# Patient Record
Sex: MALE | Race: WHITE | NOT HISPANIC OR LATINO | Employment: UNEMPLOYED | URBAN - METROPOLITAN AREA
[De-identification: names, ages, dates, MRNs, and addresses within clinical notes are randomized per-mention and may not be internally consistent; named-entity substitution may affect disease eponyms.]

---

## 2017-07-03 ENCOUNTER — HOSPITAL ENCOUNTER (OUTPATIENT)
Facility: HOSPITAL | Age: 57
Discharge: HOME | End: 2017-07-07
Attending: EMERGENCY MEDICINE | Admitting: EMERGENCY MEDICINE
Payer: COMMERCIAL

## 2017-07-03 DIAGNOSIS — I10 ESSENTIAL HYPERTENSION: ICD-10-CM

## 2017-07-03 DIAGNOSIS — Z95.5 HISTORY OF HEART ARTERY STENT: ICD-10-CM

## 2017-07-03 DIAGNOSIS — R07.2 PRECORDIAL PAIN: ICD-10-CM

## 2017-07-03 DIAGNOSIS — K82.8 GALLBLADDER SLUDGE: ICD-10-CM

## 2017-07-03 DIAGNOSIS — I25.9 CHEST PAIN DUE TO MYOCARDIAL ISCHEMIA, UNSPECIFIED ISCHEMIC CHEST PAIN TYPE: ICD-10-CM

## 2017-07-03 DIAGNOSIS — K81.2 ACUTE CHOLECYSTITIS WITH CHRONIC CHOLECYSTITIS: Primary | ICD-10-CM

## 2017-07-03 DIAGNOSIS — R07.9 CHEST PAIN, UNSPECIFIED TYPE: ICD-10-CM

## 2017-07-03 LAB
ALBUMIN SERPL BCP-MCNC: 4.1 G/DL
ALP SERPL-CCNC: 83 U/L
ALT SERPL W/O P-5'-P-CCNC: 5 U/L
ANION GAP SERPL CALC-SCNC: 10 MMOL/L
AST SERPL-CCNC: 13 U/L
BASOPHILS # BLD AUTO: 0.04 K/UL
BASOPHILS NFR BLD: 0.4 %
BILIRUB SERPL-MCNC: 0.6 MG/DL
BNP SERPL-MCNC: 27 PG/ML
BUN SERPL-MCNC: 22 MG/DL
CALCIUM SERPL-MCNC: 9.9 MG/DL
CHLORIDE SERPL-SCNC: 103 MMOL/L
CO2 SERPL-SCNC: 25 MMOL/L
CREAT SERPL-MCNC: 1 MG/DL
DIFFERENTIAL METHOD: ABNORMAL
EOSINOPHIL # BLD AUTO: 0.1 K/UL
EOSINOPHIL NFR BLD: 0.7 %
ERYTHROCYTE [DISTWIDTH] IN BLOOD BY AUTOMATED COUNT: 13.5 %
EST. GFR  (AFRICAN AMERICAN): >60 ML/MIN/1.73 M^2
EST. GFR  (NON AFRICAN AMERICAN): >60 ML/MIN/1.73 M^2
GLUCOSE SERPL-MCNC: 156 MG/DL
HCT VFR BLD AUTO: 43.9 %
HGB BLD-MCNC: 15.3 G/DL
INR PPP: 1.3
LYMPHOCYTES # BLD AUTO: 2.4 K/UL
LYMPHOCYTES NFR BLD: 22.7 %
MCH RBC QN AUTO: 32.8 PG
MCHC RBC AUTO-ENTMCNC: 34.9 %
MCV RBC AUTO: 94 FL
MONOCYTES # BLD AUTO: 1 K/UL
MONOCYTES NFR BLD: 9.3 %
NEUTROPHILS # BLD AUTO: 7 K/UL
NEUTROPHILS NFR BLD: 66.7 %
PLATELET # BLD AUTO: 369 K/UL
PMV BLD AUTO: 9.1 FL
POTASSIUM SERPL-SCNC: 3.4 MMOL/L
PROT SERPL-MCNC: 8.3 G/DL
PROTHROMBIN TIME: 13.3 SEC
RBC # BLD AUTO: 4.67 M/UL
SODIUM SERPL-SCNC: 138 MMOL/L
TROPONIN I SERPL DL<=0.01 NG/ML-MCNC: 0.02 NG/ML
TROPONIN I SERPL DL<=0.01 NG/ML-MCNC: 0.02 NG/ML
WBC # BLD AUTO: 10.44 K/UL

## 2017-07-03 PROCEDURE — 85610 PROTHROMBIN TIME: CPT

## 2017-07-03 PROCEDURE — 36415 COLL VENOUS BLD VENIPUNCTURE: CPT

## 2017-07-03 PROCEDURE — 25000003 PHARM REV CODE 250: Performed by: EMERGENCY MEDICINE

## 2017-07-03 PROCEDURE — 93005 ELECTROCARDIOGRAM TRACING: CPT

## 2017-07-03 PROCEDURE — 63600175 PHARM REV CODE 636 W HCPCS: Performed by: NURSE PRACTITIONER

## 2017-07-03 PROCEDURE — 83880 ASSAY OF NATRIURETIC PEPTIDE: CPT

## 2017-07-03 PROCEDURE — 84484 ASSAY OF TROPONIN QUANT: CPT | Mod: 91

## 2017-07-03 PROCEDURE — 84484 ASSAY OF TROPONIN QUANT: CPT

## 2017-07-03 PROCEDURE — 99284 EMERGENCY DEPT VISIT MOD MDM: CPT

## 2017-07-03 PROCEDURE — G0378 HOSPITAL OBSERVATION PER HR: HCPCS

## 2017-07-03 PROCEDURE — 80053 COMPREHEN METABOLIC PANEL: CPT

## 2017-07-03 PROCEDURE — 93010 ELECTROCARDIOGRAM REPORT: CPT | Mod: ,,, | Performed by: INTERNAL MEDICINE

## 2017-07-03 PROCEDURE — 25000003 PHARM REV CODE 250: Performed by: NURSE PRACTITIONER

## 2017-07-03 PROCEDURE — 85025 COMPLETE CBC W/AUTO DIFF WBC: CPT

## 2017-07-03 RX ORDER — HYDROCODONE BITARTRATE AND ACETAMINOPHEN 10; 325 MG/1; MG/1
1 TABLET ORAL
Status: DISCONTINUED | OUTPATIENT
Start: 2017-07-03 | End: 2017-07-03

## 2017-07-03 RX ORDER — NITROGLYCERIN 0.4 MG/1
0.4 TABLET SUBLINGUAL EVERY 5 MIN PRN
Status: DISCONTINUED | OUTPATIENT
Start: 2017-07-03 | End: 2017-07-07 | Stop reason: HOSPADM

## 2017-07-03 RX ORDER — ASPIRIN 81 MG/1
81 TABLET ORAL DAILY
Status: DISCONTINUED | OUTPATIENT
Start: 2017-07-04 | End: 2017-07-07 | Stop reason: HOSPADM

## 2017-07-03 RX ORDER — MORPHINE SULFATE 10 MG/ML
4 INJECTION INTRAMUSCULAR; INTRAVENOUS; SUBCUTANEOUS EVERY 4 HOURS PRN
Status: DISCONTINUED | OUTPATIENT
Start: 2017-07-03 | End: 2017-07-03

## 2017-07-03 RX ORDER — ALPRAZOLAM 0.5 MG/1
0.5 TABLET ORAL 3 TIMES DAILY
COMMUNITY

## 2017-07-03 RX ORDER — ALPRAZOLAM 0.5 MG/1
0.5 TABLET ORAL 3 TIMES DAILY
Status: DISCONTINUED | OUTPATIENT
Start: 2017-07-03 | End: 2017-07-07 | Stop reason: HOSPADM

## 2017-07-03 RX ORDER — AMOXICILLIN 250 MG
1 CAPSULE ORAL 2 TIMES DAILY
Status: DISCONTINUED | OUTPATIENT
Start: 2017-07-03 | End: 2017-07-07 | Stop reason: HOSPADM

## 2017-07-03 RX ORDER — HYDRALAZINE HYDROCHLORIDE 20 MG/ML
10 INJECTION INTRAMUSCULAR; INTRAVENOUS EVERY 8 HOURS PRN
Status: DISCONTINUED | OUTPATIENT
Start: 2017-07-03 | End: 2017-07-07 | Stop reason: HOSPADM

## 2017-07-03 RX ORDER — HYDROCODONE BITARTRATE AND ACETAMINOPHEN 10; 325 MG/1; MG/1
1 TABLET ORAL EVERY 6 HOURS PRN
Status: DISCONTINUED | OUTPATIENT
Start: 2017-07-03 | End: 2017-07-07 | Stop reason: HOSPADM

## 2017-07-03 RX ORDER — ONDANSETRON 2 MG/ML
4 INJECTION INTRAMUSCULAR; INTRAVENOUS EVERY 12 HOURS PRN
Status: DISCONTINUED | OUTPATIENT
Start: 2017-07-03 | End: 2017-07-07 | Stop reason: HOSPADM

## 2017-07-03 RX ORDER — ACETAMINOPHEN 325 MG/1
650 TABLET ORAL EVERY 6 HOURS PRN
Status: DISCONTINUED | OUTPATIENT
Start: 2017-07-03 | End: 2017-07-03

## 2017-07-03 RX ORDER — ACETAMINOPHEN 325 MG/1
650 TABLET ORAL EVERY 6 HOURS PRN
Status: DISCONTINUED | OUTPATIENT
Start: 2017-07-03 | End: 2017-07-07 | Stop reason: HOSPADM

## 2017-07-03 RX ORDER — SODIUM CHLORIDE 0.9 % (FLUSH) 0.9 %
3 SYRINGE (ML) INJECTION EVERY 8 HOURS
Status: DISCONTINUED | OUTPATIENT
Start: 2017-07-03 | End: 2017-07-07 | Stop reason: HOSPADM

## 2017-07-03 RX ORDER — HEPARIN SODIUM 5000 [USP'U]/ML
5000 INJECTION, SOLUTION INTRAVENOUS; SUBCUTANEOUS EVERY 8 HOURS
Status: DISCONTINUED | OUTPATIENT
Start: 2017-07-03 | End: 2017-07-03

## 2017-07-03 RX ORDER — ASPIRIN 81 MG/1
81 TABLET ORAL DAILY
COMMUNITY

## 2017-07-03 RX ORDER — AMIODARONE HYDROCHLORIDE 150 MG/3ML
INJECTION, SOLUTION INTRAVENOUS
Status: DISCONTINUED
Start: 2017-07-03 | End: 2017-07-03 | Stop reason: WASHOUT

## 2017-07-03 RX ORDER — HYDROCODONE BITARTRATE AND ACETAMINOPHEN 10; 325 MG/1; MG/1
TABLET ORAL
COMMUNITY
End: 2019-03-25

## 2017-07-03 RX ORDER — ENOXAPARIN SODIUM 100 MG/ML
40 INJECTION SUBCUTANEOUS EVERY 24 HOURS
Status: DISCONTINUED | OUTPATIENT
Start: 2017-07-03 | End: 2017-07-06

## 2017-07-03 RX ORDER — DIAZEPAM 5 MG/1
5 TABLET ORAL CONTINUOUS PRN
COMMUNITY
End: 2019-03-25

## 2017-07-03 RX ORDER — ASPIRIN 325 MG
325 TABLET ORAL ONCE
Status: DISCONTINUED | OUTPATIENT
Start: 2017-07-03 | End: 2017-07-03

## 2017-07-03 RX ORDER — DIAZEPAM 5 MG/1
5 TABLET ORAL EVERY 6 HOURS PRN
Status: DISCONTINUED | OUTPATIENT
Start: 2017-07-03 | End: 2017-07-03

## 2017-07-03 RX ORDER — LISINOPRIL 5 MG/1
5 TABLET ORAL DAILY
Status: DISCONTINUED | OUTPATIENT
Start: 2017-07-04 | End: 2017-07-04

## 2017-07-03 RX ORDER — ASPIRIN 325 MG
325 TABLET ORAL DAILY
Status: DISCONTINUED | OUTPATIENT
Start: 2017-07-03 | End: 2017-07-03

## 2017-07-03 RX ORDER — FAMOTIDINE 20 MG/1
20 TABLET, FILM COATED ORAL DAILY
Status: DISCONTINUED | OUTPATIENT
Start: 2017-07-04 | End: 2017-07-07 | Stop reason: HOSPADM

## 2017-07-03 RX ORDER — METOPROLOL TARTRATE 25 MG/1
25 TABLET, FILM COATED ORAL 2 TIMES DAILY
Status: DISCONTINUED | OUTPATIENT
Start: 2017-07-03 | End: 2017-07-07 | Stop reason: HOSPADM

## 2017-07-03 RX ADMIN — ALPRAZOLAM 0.5 MG: 0.5 TABLET ORAL at 09:07

## 2017-07-03 RX ADMIN — ASPIRIN 325 MG ORAL TABLET 325 MG: 325 PILL ORAL at 05:07

## 2017-07-03 RX ADMIN — METOPROLOL TARTRATE 25 MG: 25 TABLET ORAL at 09:07

## 2017-07-03 RX ADMIN — HYDROCODONE BITARTRATE AND ACETAMINOPHEN 1 TABLET: 10; 325 TABLET ORAL at 09:07

## 2017-07-03 RX ADMIN — ENOXAPARIN SODIUM 40 MG: 100 INJECTION SUBCUTANEOUS at 09:07

## 2017-07-03 NOTE — ED TRIAGE NOTES
Pt comes in with complaints of intermittent left side chest tightness and sob x 5 days which occurs only when he ambulates. Pt states he has cardiac stents. Pt denies cough, fever/chills, or nausea.

## 2017-07-03 NOTE — ED PROVIDER NOTES
Encounter Date: 7/3/2017    SCRIBE #1 NOTE: I, Krystian Sorensen, am scribing for, and in the presence of,  Georges Santiago MD. I have scribed the following portions of the note - Other sections scribed: HPI/ROS/PE.       History     Chief Complaint   Patient presents with    Chest Pain     pt states left sided cp with sob times 3 days      CC: Chest Pain    HPI: This 56 y.o. Male with a PMHx of HTN presents to the ED c/o intermittent, moderate chest pain with associated symptoms SOB that initially began about 5 days ago. Patient reports that pain is exacerbated with exertion and alleviated with 30 minutes of rest. Patient notes that he had a stent placed due to a 95% blockage in 2014 by Dr. Evangelista. He is non-compliant with nitroglycerin. Patient denies fever, chills, N/V/D, abdominal pain, neck pain, back pain, numbness, and weakness.       The history is provided by the patient and the spouse. No  was used.     Review of patient's allergies indicates:   Allergen Reactions    Iodine and iodide containing products Hives     Past Medical History:   Diagnosis Date    Hypertension      Past Surgical History:   Procedure Laterality Date    cardiac stents      KIDNEY STONE SURGERY      neck surgery       History reviewed. No pertinent family history.  Social History   Substance Use Topics    Smoking status: Never Smoker    Smokeless tobacco: Never Used    Alcohol use No     Review of Systems   Constitutional: Negative for chills and fever.   HENT: Negative for congestion.    Eyes: Negative for pain.   Respiratory: Positive for shortness of breath. Negative for cough.    Cardiovascular: Positive for chest pain.   Gastrointestinal: Negative for abdominal pain, diarrhea, nausea and vomiting.   Genitourinary: Negative for difficulty urinating.   Musculoskeletal: Negative for back pain, neck pain and neck stiffness.   Skin: Negative for rash.   Neurological: Negative for weakness, numbness and  headaches.       Physical Exam     Initial Vitals [07/03/17 1348]   BP Pulse Resp Temp SpO2   (!) 135/99 94 18 98.3 °F (36.8 °C) 99 %      MAP       111         Physical Exam    Constitutional: He appears well-developed and well-nourished.   HENT:   Head: Normocephalic and atraumatic.   Eyes: EOM are normal.   Neck: Normal range of motion. Neck supple.   Cardiovascular: Normal rate and regular rhythm.   Pulmonary/Chest: Breath sounds normal. No respiratory distress.   Abdominal: Soft. Bowel sounds are normal.   Musculoskeletal: Normal range of motion.   Neurological: He is alert.   Skin: Skin is warm and dry.   Psychiatric: He has a normal mood and affect.         ED Course   Procedures  Labs Reviewed   CBC W/ AUTO DIFFERENTIAL - Abnormal; Notable for the following:        Result Value    MCH 32.8 (*)     Platelets 369 (*)     MPV 9.1 (*)     All other components within normal limits   COMPREHENSIVE METABOLIC PANEL - Abnormal; Notable for the following:     Potassium 3.4 (*)     Glucose 156 (*)     BUN, Bld 22 (*)     ALT 5 (*)     All other components within normal limits   PROTIME-INR - Abnormal; Notable for the following:     Prothrombin Time 13.3 (*)     INR 1.3 (*)     All other components within normal limits   B-TYPE NATRIURETIC PEPTIDE   TROPONIN I     EKG Readings: (Independently Interpreted)   Initial Reading: No STEMI. Rhythm: Sinus Tachycardia. Heart Rate: 104. ST Segments: Normal ST Segments. T Waves Flipped: III and AVF.          Medical Decision Making:   History:   Old Medical Records: I decided to obtain old medical records.  Clinical Tests:   Lab Tests: Ordered and Reviewed  Radiological Study: Ordered and Reviewed  Medical Tests: Ordered and Reviewed  ED Management:  This is a 56-year-old male complaining of exertional chest pain every time he walks for the past 5 days.  He has a history of CAD and received a stent in 2014.  He did not take nitroglycerin.  He is noncompliant with medications.  His  cardiologist is Dr. Evangelista.  His EKG and initial troponin have been normal.  His history suggests unstable angina.  There is no evidence of ACS or heart failure at this time.  He does not have pneumonia or pneumothorax.  His case is discussed with Dr. Forrester.  We agreed that the patient will be admitted for observation.  Today is July 3.  He will have an angiogram done on July 5.  He will be observed in the hospital.  He is stable for telemetry.            Scribe Attestation:   Scribe #1: I performed the above scribed service and the documentation accurately describes the services I performed. I attest to the accuracy of the note.    Attending Attestation:           Physician Attestation for Scribe:  Physician Attestation Statement for Scribe #1: I, Georges Santiago MD, reviewed documentation, as scribed by Krystian Sorensen in my presence, and it is both accurate and complete.                 ED Course     Clinical Impression:   The encounter diagnosis was Chest pain, unspecified type.    Disposition:   Disposition: Placed in Observation  Condition: Stable                        Georges Santiago MD  07/03/17 1326

## 2017-07-04 PROBLEM — F41.9 ANXIETY: Status: ACTIVE | Noted: 2017-07-04

## 2017-07-04 LAB
ALBUMIN SERPL BCP-MCNC: 3.8 G/DL
ALP SERPL-CCNC: 82 U/L
ALT SERPL W/O P-5'-P-CCNC: 6 U/L
ANION GAP SERPL CALC-SCNC: 10 MMOL/L
AST SERPL-CCNC: 12 U/L
BASOPHILS # BLD AUTO: 0.06 K/UL
BASOPHILS NFR BLD: 0.6 %
BILIRUB SERPL-MCNC: 0.5 MG/DL
BUN SERPL-MCNC: 22 MG/DL
CALCIUM SERPL-MCNC: 9.9 MG/DL
CHLORIDE SERPL-SCNC: 102 MMOL/L
CHOLEST/HDLC SERPL: 6 {RATIO}
CO2 SERPL-SCNC: 28 MMOL/L
CREAT SERPL-MCNC: 1 MG/DL
DIFFERENTIAL METHOD: ABNORMAL
EOSINOPHIL # BLD AUTO: 0.3 K/UL
EOSINOPHIL NFR BLD: 2.7 %
ERYTHROCYTE [DISTWIDTH] IN BLOOD BY AUTOMATED COUNT: 14 %
EST. GFR  (AFRICAN AMERICAN): >60 ML/MIN/1.73 M^2
EST. GFR  (NON AFRICAN AMERICAN): >60 ML/MIN/1.73 M^2
GLUCOSE SERPL-MCNC: 104 MG/DL
HCT VFR BLD AUTO: 45 %
HDL/CHOLESTEROL RATIO: 16.7 %
HDLC SERPL-MCNC: 258 MG/DL
HDLC SERPL-MCNC: 43 MG/DL
HGB BLD-MCNC: 15.2 G/DL
LDLC SERPL CALC-MCNC: 189.4 MG/DL
LYMPHOCYTES # BLD AUTO: 3.4 K/UL
LYMPHOCYTES NFR BLD: 36.8 %
MCH RBC QN AUTO: 32.5 PG
MCHC RBC AUTO-ENTMCNC: 33.8 %
MCV RBC AUTO: 96 FL
MONOCYTES # BLD AUTO: 1.2 K/UL
MONOCYTES NFR BLD: 12.6 %
NEUTROPHILS # BLD AUTO: 4.4 K/UL
NEUTROPHILS NFR BLD: 47.3 %
NONHDLC SERPL-MCNC: 215 MG/DL
PLATELET # BLD AUTO: 330 K/UL
PMV BLD AUTO: 9.4 FL
POTASSIUM SERPL-SCNC: 3.7 MMOL/L
PROT SERPL-MCNC: 7.8 G/DL
RBC # BLD AUTO: 4.68 M/UL
SODIUM SERPL-SCNC: 140 MMOL/L
T4 FREE SERPL-MCNC: 1.32 NG/DL
TRIGL SERPL-MCNC: 128 MG/DL
TROPONIN I SERPL DL<=0.01 NG/ML-MCNC: <0.006 NG/ML
TSH SERPL DL<=0.005 MIU/L-ACNC: 0.37 UIU/ML
WBC # BLD AUTO: 9.24 K/UL

## 2017-07-04 PROCEDURE — 36415 COLL VENOUS BLD VENIPUNCTURE: CPT

## 2017-07-04 PROCEDURE — 25000003 PHARM REV CODE 250: Performed by: EMERGENCY MEDICINE

## 2017-07-04 PROCEDURE — 25000003 PHARM REV CODE 250: Performed by: NURSE PRACTITIONER

## 2017-07-04 PROCEDURE — G0378 HOSPITAL OBSERVATION PER HR: HCPCS

## 2017-07-04 PROCEDURE — 85025 COMPLETE CBC W/AUTO DIFF WBC: CPT

## 2017-07-04 PROCEDURE — 63600175 PHARM REV CODE 636 W HCPCS: Performed by: INTERNAL MEDICINE

## 2017-07-04 PROCEDURE — 80053 COMPREHEN METABOLIC PANEL: CPT

## 2017-07-04 PROCEDURE — 84443 ASSAY THYROID STIM HORMONE: CPT

## 2017-07-04 PROCEDURE — 63600175 PHARM REV CODE 636 W HCPCS: Performed by: EMERGENCY MEDICINE

## 2017-07-04 PROCEDURE — 84439 ASSAY OF FREE THYROXINE: CPT

## 2017-07-04 PROCEDURE — 25000003 PHARM REV CODE 250: Performed by: INTERNAL MEDICINE

## 2017-07-04 PROCEDURE — 80061 LIPID PANEL: CPT

## 2017-07-04 PROCEDURE — 84484 ASSAY OF TROPONIN QUANT: CPT

## 2017-07-04 PROCEDURE — 63600175 PHARM REV CODE 636 W HCPCS: Performed by: NURSE PRACTITIONER

## 2017-07-04 RX ORDER — PREDNISONE 20 MG/1
60 TABLET ORAL ONCE
Status: COMPLETED | OUTPATIENT
Start: 2017-07-05 | End: 2017-07-05

## 2017-07-04 RX ORDER — PREDNISONE 20 MG/1
60 TABLET ORAL DAILY
Status: DISCONTINUED | OUTPATIENT
Start: 2017-07-04 | End: 2017-07-04

## 2017-07-04 RX ORDER — DIPHENHYDRAMINE HCL 50 MG
50 CAPSULE ORAL ONCE
Status: COMPLETED | OUTPATIENT
Start: 2017-07-05 | End: 2017-07-05

## 2017-07-04 RX ORDER — DIPHENHYDRAMINE HCL 50 MG
50 CAPSULE ORAL ONCE
Status: DISCONTINUED | OUTPATIENT
Start: 2017-07-04 | End: 2017-07-04

## 2017-07-04 RX ORDER — ATORVASTATIN CALCIUM 40 MG/1
80 TABLET, FILM COATED ORAL DAILY
Status: DISCONTINUED | OUTPATIENT
Start: 2017-07-04 | End: 2017-07-07 | Stop reason: HOSPADM

## 2017-07-04 RX ORDER — LISINOPRIL 20 MG/1
20 TABLET ORAL DAILY
Status: DISCONTINUED | OUTPATIENT
Start: 2017-07-04 | End: 2017-07-07 | Stop reason: HOSPADM

## 2017-07-04 RX ORDER — RAMELTEON 8 MG/1
8 TABLET ORAL NIGHTLY PRN
Status: DISCONTINUED | OUTPATIENT
Start: 2017-07-04 | End: 2017-07-07 | Stop reason: HOSPADM

## 2017-07-04 RX ORDER — PREDNISONE 20 MG/1
60 TABLET ORAL DAILY
Status: DISCONTINUED | OUTPATIENT
Start: 2017-07-05 | End: 2017-07-04

## 2017-07-04 RX ORDER — AMLODIPINE BESYLATE 5 MG/1
5 TABLET ORAL DAILY
Status: DISCONTINUED | OUTPATIENT
Start: 2017-07-04 | End: 2017-07-07 | Stop reason: HOSPADM

## 2017-07-04 RX ORDER — PREDNISONE 20 MG/1
60 TABLET ORAL ONCE
Status: COMPLETED | OUTPATIENT
Start: 2017-07-04 | End: 2017-07-04

## 2017-07-04 RX ORDER — ISOSORBIDE MONONITRATE 30 MG/1
60 TABLET, EXTENDED RELEASE ORAL DAILY
Status: DISCONTINUED | OUTPATIENT
Start: 2017-07-04 | End: 2017-07-07 | Stop reason: HOSPADM

## 2017-07-04 RX ORDER — PREDNISONE 20 MG/1
60 TABLET ORAL ONCE
Status: DISCONTINUED | OUTPATIENT
Start: 2017-07-04 | End: 2017-07-04

## 2017-07-04 RX ADMIN — ALPRAZOLAM 0.5 MG: 0.5 TABLET ORAL at 06:07

## 2017-07-04 RX ADMIN — Medication 3 ML: at 09:07

## 2017-07-04 RX ADMIN — Medication 3 ML: at 02:07

## 2017-07-04 RX ADMIN — ISOSORBIDE MONONITRATE 60 MG: 30 TABLET, EXTENDED RELEASE ORAL at 09:07

## 2017-07-04 RX ADMIN — DOCUSATE SODIUM AND SENNOSIDES 1 TABLET: 8.6; 5 TABLET, FILM COATED ORAL at 09:07

## 2017-07-04 RX ADMIN — SODIUM CHLORIDE 1000 ML: 0.9 INJECTION, SOLUTION INTRAVENOUS at 10:07

## 2017-07-04 RX ADMIN — ALPRAZOLAM 0.5 MG: 0.5 TABLET ORAL at 09:07

## 2017-07-04 RX ADMIN — ASPIRIN 81 MG: 81 TABLET, COATED ORAL at 09:07

## 2017-07-04 RX ADMIN — ALPRAZOLAM 0.5 MG: 0.5 TABLET ORAL at 02:07

## 2017-07-04 RX ADMIN — ATORVASTATIN CALCIUM 80 MG: 40 TABLET, FILM COATED ORAL at 09:07

## 2017-07-04 RX ADMIN — PREDNISONE 60 MG: 20 TABLET ORAL at 09:07

## 2017-07-04 RX ADMIN — METOPROLOL TARTRATE 25 MG: 25 TABLET ORAL at 09:07

## 2017-07-04 RX ADMIN — HYDROCODONE BITARTRATE AND ACETAMINOPHEN 1 TABLET: 10; 325 TABLET ORAL at 07:07

## 2017-07-04 RX ADMIN — ONDANSETRON 4 MG: 2 INJECTION INTRAMUSCULAR; INTRAVENOUS at 11:07

## 2017-07-04 RX ADMIN — ENOXAPARIN SODIUM 40 MG: 100 INJECTION SUBCUTANEOUS at 05:07

## 2017-07-04 RX ADMIN — AMLODIPINE BESYLATE 5 MG: 5 TABLET ORAL at 09:07

## 2017-07-04 RX ADMIN — FAMOTIDINE 20 MG: 20 TABLET, FILM COATED ORAL at 09:07

## 2017-07-04 RX ADMIN — LISINOPRIL 20 MG: 20 TABLET ORAL at 09:07

## 2017-07-04 NOTE — SUBJECTIVE & OBJECTIVE
Past Medical History:   Diagnosis Date    Hypertension        Past Surgical History:   Procedure Laterality Date    cardiac stents      KIDNEY STONE SURGERY      neck surgery         Review of patient's allergies indicates:   Allergen Reactions    Iodine and iodide containing products Hives       No current facility-administered medications on file prior to encounter.      No current outpatient prescriptions on file prior to encounter.     Family History     None        Social History Main Topics    Smoking status: Never Smoker    Smokeless tobacco: Never Used    Alcohol use No    Drug use: Unknown    Sexual activity: Not on file     Review of Systems   Constitutional: Negative for chills, diaphoresis and fever.   HENT: Negative for congestion, postnasal drip, sinus pressure and sore throat.    Eyes: Negative for visual disturbance.   Respiratory: Positive for shortness of breath. Negative for cough and wheezing.    Cardiovascular: Positive for chest pain (non-radiating left sided). Negative for palpitations and leg swelling.        Heart flutters   Gastrointestinal: Negative for abdominal distention, abdominal pain, blood in stool, constipation, diarrhea, nausea and vomiting.   Endocrine: Negative.    Genitourinary: Negative for dysuria.   Musculoskeletal: Negative.    Skin: Negative.    Allergic/Immunologic: Negative.    Neurological: Negative for dizziness, weakness, numbness and headaches.   Hematological: Negative.    Psychiatric/Behavioral: Negative.      Objective:     Vital Signs (Most Recent):  Temp: 98.4 °F (36.9 °C) (07/03/17 1837)  Pulse: 80 (07/03/17 1837)  Resp: 18 (07/03/17 1837)  BP: (!) 168/92 (07/03/17 1837)  SpO2: 98 % (07/03/17 1837) Vital Signs (24h Range):  Temp:  [98.3 °F (36.8 °C)-98.4 °F (36.9 °C)] 98.4 °F (36.9 °C)  Pulse:  [78-94] 80  Resp:  [18] 18  SpO2:  [95 %-99 %] 98 %  BP: (133-170)/() 168/92     Weight: 77.1 kg (170 lb)  Body mass index is 25.1 kg/m².    Physical  Exam   Constitutional: He is oriented to person, place, and time. He appears well-developed and well-nourished. He is cooperative.  Non-toxic appearance. No distress.   HENT:   Head: Normocephalic and atraumatic.   Eyes: Conjunctivae and lids are normal. Pupils are equal, round, and reactive to light.   Neck: Trachea normal, normal range of motion and full passive range of motion without pain. Neck supple. Normal carotid pulses and no JVD present. No tracheal deviation present. No thyroid mass and no thyromegaly present.   Cardiovascular: Regular rhythm, S1 normal, S2 normal, normal heart sounds and normal pulses.  Tachycardia present.    Pulmonary/Chest: Effort normal and breath sounds normal. No stridor.   Abdominal: Soft. Normal appearance and bowel sounds are normal. There is no tenderness.   Musculoskeletal: Normal range of motion.   Neurological: He is alert and oriented to person, place, and time. He has normal strength. No cranial nerve deficit or sensory deficit.   Skin: Skin is warm, dry and intact. He is not diaphoretic. No cyanosis. Nails show no clubbing.   Psychiatric: He has a normal mood and affect. His speech is normal and behavior is normal. Judgment and thought content normal. Cognition and memory are normal.        Significant Labs:   CBC:   Recent Labs  Lab 07/03/17  1400   WBC 10.44   HGB 15.3   HCT 43.9   *     CMP:   Recent Labs  Lab 07/03/17  1400      K 3.4*      CO2 25   *   BUN 22*   CREATININE 1.0   CALCIUM 9.9   PROT 8.3   ALBUMIN 4.1   BILITOT 0.6   ALKPHOS 83   AST 13   ALT 5*   ANIONGAP 10   EGFRNONAA >60     Cardiac Markers:   Recent Labs  Lab 07/03/17  1400   BNP 27     Troponin:   Recent Labs  Lab 07/03/17  1400   TROPONINI 0.016     Significant Imaging:   Imaging Results          X-Ray Chest PA And Lateral (Final result)  Result time 07/03/17 14:43:47    Final result by Siddharth Norwood MD (07/03/17 14:43:47)                 Impression:        No  evidence of active cardiopulmonary disease.      Electronically signed by: MANDO SHEARER MD  Date:     07/03/17  Time:    14:43              Narrative:    XR Chest    07/03/17 14:03:31    Accession# [id number]    CLINICAL INDICATION: 56 year old M with Chest Pain     TECHNIQUE: PA and lateral radiographs of the chest.    COMPARISON:  No priors.    FINDINGS:    The cardiomediastinal silhouette is normal in size and midline. Pulmonary vascularity appears within normal limits.    The lungs appear clear without confluent pulmonary parenchymal opacity. No pleural fluid.    Osseous structures appear intact. Lower cervical ACDF.

## 2017-07-04 NOTE — NURSING
PER handoff received from CHRISTINA Bundy RN. Responds to voice and pt is oriented x4 . Denies having any pain and is in no apparent stress at current time. Assessment completed per Doc Flowsheets; reference if needed. Updated pt on plan of care. Fall and safety precautions maintained. Bed locked in lowest position, with side rails up x2. Call bell and personal items within reach. Will continue to monitor pt for any changes.

## 2017-07-04 NOTE — SUBJECTIVE & OBJECTIVE
Interval History: continued nausea and decreased appetite. Intermittent CP; restless - Cardiology following - [facility] in am    Review of Systems   Constitutional: Negative for appetite change, chills, diaphoresis and fever.   HENT: Negative for congestion, hearing loss, sore throat, tinnitus and trouble swallowing.    Eyes: Negative for photophobia, discharge, itching and visual disturbance.   Respiratory: Negative for apnea, cough, wheezing and stridor.    Cardiovascular: Positive for chest pain. Negative for palpitations and leg swelling.   Gastrointestinal: Positive for nausea. Negative for abdominal distention, abdominal pain, blood in stool, constipation and diarrhea.   Endocrine: Negative for polydipsia, polyphagia and polyuria.   Genitourinary: Negative for difficulty urinating, dysuria, flank pain and frequency.   Musculoskeletal: Negative for arthralgias, joint swelling and neck stiffness.   Skin: Negative for color change, rash and wound.   Neurological: Negative for dizziness, tremors, seizures, light-headedness, numbness and headaches.   Hematological: Negative for adenopathy.   Psychiatric/Behavioral: Negative for hallucinations and self-injury.     Objective:     Vital Signs (Most Recent):  Temp: 98 °F (36.7 °C) (07/04/17 1120)  Pulse: (!) 50 (07/04/17 1120)  Resp: 18 (07/04/17 1120)  BP: 105/68 (07/04/17 1120)  SpO2: 98 % (07/04/17 1120) Vital Signs (24h Range):  Temp:  [97.6 °F (36.4 °C)-98.5 °F (36.9 °C)] 98 °F (36.7 °C)  Pulse:  [50-94] 50  Resp:  [16-18] 18  SpO2:  [95 %-100 %] 98 %  BP: (105-197)/() 105/68     Weight: 73.3 kg (161 lb 9.6 oz)  Body mass index is 23.86 kg/m².  No intake or output data in the 24 hours ending 07/04/17 1248   Physical Exam   Constitutional: He appears well-developed and well-nourished. He is cooperative.   HENT:   Head: Normocephalic and atraumatic.   Eyes: Conjunctivae and lids are normal.   Neck: Full passive range of motion without pain. Neck supple. No JVD  present. No edema present. No thyroid mass present.   Cardiovascular: S1 normal, S2 normal and intact distal pulses.    No murmur heard.  Abdominal: Soft. Bowel sounds are normal. He exhibits no distension and no abdominal bruit. There is no splenomegaly or hepatomegaly. There is no tenderness. There is no CVA tenderness.   nausea   Lymphadenopathy:     He has no cervical adenopathy.     He has no axillary adenopathy.   Neurological: He is alert. He has normal reflexes. He displays no tremor. He displays no seizure activity.   Skin: Skin is warm, dry and intact.   Psychiatric: He has a normal mood and affect. His speech is normal. Thought content normal. Cognition and memory are normal.       Significant Labs:   BMP:   Recent Labs  Lab 07/04/17 0423         K 3.7      CO2 28   BUN 22*   CREATININE 1.0   CALCIUM 9.9     CBC:   Recent Labs  Lab 07/03/17 1400 07/04/17  0423   WBC 10.44 9.24   HGB 15.3 15.2   HCT 43.9 45.0   * 330     Cardiac Markers:   Recent Labs  Lab 07/03/17  1400   BNP 27     Coagulation:   Recent Labs  Lab 07/03/17  1400   INR 1.3*     Lipase: No results for input(s): LIPASE in the last 48 hours.  Lipid Panel:   Recent Labs  Lab 07/04/17  0423   CHOL 258*   HDL 43   LDLCALC 189.4*   TRIG 128   CHOLHDL 16.7*     Magnesium: No results for input(s): MG in the last 48 hours.  Troponin:   Recent Labs  Lab 07/03/17 1400 07/03/17 2009 07/04/17  0157   TROPONINI 0.016 0.018 <0.006     TSH:   Recent Labs  Lab 07/04/17  0423   TSH 0.370*     Urine Culture: No results for input(s): LABURIN in the last 48 hours.    Significant Imaging:   Imaging Results          X-Ray Chest PA And Lateral (Final result)  Result time 07/03/17 14:43:47    Final result by Mando Shearer MD (07/03/17 14:43:47)                 Impression:        No evidence of active cardiopulmonary disease.      Electronically signed by: MANDO SHEARER MD  Date:     07/03/17  Time:    14:43              Narrative:     XR Chest    07/03/17 14:03:31    Accession# [id number]    CLINICAL INDICATION: 56 year old M with Chest Pain     TECHNIQUE: PA and lateral radiographs of the chest.    COMPARISON:  No priors.    FINDINGS:    The cardiomediastinal silhouette is normal in size and midline. Pulmonary vascularity appears within normal limits.    The lungs appear clear without confluent pulmonary parenchymal opacity. No pleural fluid.    Osseous structures appear intact. Lower cervical ACDF.

## 2017-07-04 NOTE — H&P
"[facility] Medicine  History & Physical    Patient Name: Kayden Zamora  MRN: [id number]  Admission Date: 7/3/2017  Attending Physician: Roseann Jose MD   Primary Care Provider: Oral Gallardo MD         Patient information was obtained from patient, spouse/SO and ER records.     Subjective:     Principal Problem:Chest pain    Chief Complaint:   Chief Complaint   Patient presents with    Chest Pain     pt states left sided cp with sob times 3 days         HPI: Kayden Zamora is a 56 y.o. male with a history as below who presented to the ED with a CC of non-radiating left sided chest pain 7/10 with associated flutters and SOB ~ 5 days.  He describes the chest pain as "tightness" intermittent with activity with each episode lasting ~ 20-30 minutes and resolving with rest. He denies fever, chills, diaphoretic, cough, BLE edema, GI/ symptoms, recent illness, trauma or any other associated symptoms. He reports he takes a ASA 81 mg daily.  Patient states he thought symptoms could be stress/anxiety so he took an old valium that he had and without relief. He also reports he had a stent place in March 2014 and endorses non-adherences to previous medications regimen after stent placement and states "I just felt like I didn't need them". Family history - mon(a) multiple stents and TIAs; dad (a) 5 vessel CABG.  He denies tobacco or ETOH use.  Found in ED to have non-ischemic EKG.  Initial troponin negative. BNP wnl. CXR is non-revealing. Dr. Forrester, crdiology, consulted in ED with plans for LHC on Wednesday.  Admitted to OBS for symptoms management. He denies recurrent CP and is non-distressed.                                                         Past Medical History:   Diagnosis Date    Hypertension        Past Surgical History:   Procedure Laterality Date    cardiac stents      KIDNEY STONE SURGERY      neck surgery         Review of patient's allergies indicates:   Allergen Reactions "    Iodine and iodide containing products Hives       No current facility-administered medications on file prior to encounter.      No current outpatient prescriptions on file prior to encounter.     Family History     None        Social History Main Topics    Smoking status: Never Smoker    Smokeless tobacco: Never Used    Alcohol use No    Drug use: Unknown    Sexual activity: Not on file     Review of Systems   Constitutional: Negative for chills, diaphoresis and fever.   HENT: Negative for congestion, postnasal drip, sinus pressure and sore throat.    Eyes: Negative for visual disturbance.   Respiratory: Positive for shortness of breath. Negative for cough and wheezing.    Cardiovascular: Positive for chest pain (non-radiating left sided). Negative for palpitations and leg swelling.        Heart flutters   Gastrointestinal: Negative for abdominal distention, abdominal pain, blood in stool, constipation, diarrhea, nausea and vomiting.   Endocrine: Negative.    Genitourinary: Negative for dysuria.   Musculoskeletal: Negative.    Skin: Negative.    Allergic/Immunologic: Negative.    Neurological: Negative for dizziness, weakness, numbness and headaches.   Hematological: Negative.    Psychiatric/Behavioral: Negative.      Objective:     Vital Signs (Most Recent):  Temp: 98.4 °F (36.9 °C) (07/03/17 1837)  Pulse: 80 (07/03/17 1837)  Resp: 18 (07/03/17 1837)  BP: (!) 168/92 (07/03/17 1837)  SpO2: 98 % (07/03/17 1837) Vital Signs (24h Range):  Temp:  [98.3 °F (36.8 °C)-98.4 °F (36.9 °C)] 98.4 °F (36.9 °C)  Pulse:  [78-94] 80  Resp:  [18] 18  SpO2:  [95 %-99 %] 98 %  BP: (133-170)/() 168/92     Weight: 77.1 kg (170 lb)  Body mass index is 25.1 kg/m².    Physical Exam   Constitutional: He is oriented to person, place, and time. He appears well-developed and well-nourished. He is cooperative.  Non-toxic appearance. No distress.   HENT:   Head: Normocephalic and atraumatic.   Eyes: Conjunctivae and lids are  normal. Pupils are equal, round, and reactive to light.   Neck: Trachea normal, normal range of motion and full passive range of motion without pain. Neck supple. Normal carotid pulses and no JVD present. No tracheal deviation present. No thyroid mass and no thyromegaly present.   Cardiovascular: Regular rhythm, S1 normal, S2 normal, normal heart sounds and normal pulses.  Tachycardia present.    Pulmonary/Chest: Effort normal and breath sounds normal. No stridor.   Abdominal: Soft. Normal appearance and bowel sounds are normal. There is no tenderness.   Musculoskeletal: Normal range of motion.   Neurological: He is alert and oriented to person, place, and time. He has normal strength. No cranial nerve deficit or sensory deficit.   Skin: Skin is warm, dry and intact. He is not diaphoretic. No cyanosis. Nails show no clubbing.   Psychiatric: He has a normal mood and affect. His speech is normal and behavior is normal. Judgment and thought content normal. Cognition and memory are normal.        Significant Labs:   CBC:   Recent Labs  Lab 07/03/17  1400   WBC 10.44   HGB 15.3   HCT 43.9   *     CMP:   Recent Labs  Lab 07/03/17  1400      K 3.4*      CO2 25   *   BUN 22*   CREATININE 1.0   CALCIUM 9.9   PROT 8.3   ALBUMIN 4.1   BILITOT 0.6   ALKPHOS 83   AST 13   ALT 5*   ANIONGAP 10   EGFRNONAA >60     Cardiac Markers:   Recent Labs  Lab 07/03/17  1400   BNP 27     Troponin:   Recent Labs  Lab 07/03/17  1400   TROPONINI 0.016     Significant Imaging:   Imaging Results          X-Ray Chest PA And Lateral (Final result)  Result time 07/03/17 14:43:47    Final result by Mando Shearer MD (07/03/17 14:43:47)                 Impression:        No evidence of active cardiopulmonary disease.      Electronically signed by: MANDO SHEARER MD  Date:     07/03/17  Time:    14:43              Narrative:    XR Chest    07/03/17 14:03:31    Accession# [id number]    CLINICAL INDICATION: 56 year old M with  Chest Pain     TECHNIQUE: PA and lateral radiographs of the chest.    COMPARISON:  No priors.    FINDINGS:    The cardiomediastinal silhouette is normal in size and midline. Pulmonary vascularity appears within normal limits.    The lungs appear clear without confluent pulmonary parenchymal opacity. No pleural fluid.    Osseous structures appear intact. Lower cervical ACDF.                                Assessment/Plan:     * Chest pain    Admitted for rule out ACS. Could likely be anginal equivocal as patient reports chest pain is exertional and relieves with rest.  Ischemic work-up (-) to date. EKG is non-ischemic. Initial troponin level negative. BNP wnl. Plan for continuous cardiac monitoring. Continue to trend serial troponins q6 hours X 2. ASA/NTG 0.4 mg SL PRN CP. Cardiology, known to him, consulted in ED with plans for LHC on Wednesday 07/05/2017.               Essential hypertension    Patient denies HTN, however, BP elevated. Reports at one point he was on metoprolol.  Will restart low dose BB + ACEi. IV hydralazine PRN.             History of heart artery stent    [facility]. Patient reports he was on ?plavix and metoprolol, however, has since taken himself off the medications (maybe 1.5 years ago). Also reports he has not followed up with cards.   I will restated low dose BB and defer further management to cardiology who has been consulted on case.              VTE Risk Mitigation         Ordered     enoxaparin injection 40 mg  Daily     Route:  Subcutaneous        07/03/17 1744     Medium Risk of VTE  Once      07/03/17 1741        JANETT Vargas  Department of Hospital Medicine   [facility]

## 2017-07-04 NOTE — ASSESSMENT & PLAN NOTE
Admitted for rule out ACS. Could likely be anginal equivocal as patient reports chest pain is exertional and relieves with rest.  Ischemic work-up (-) to date. EKG is non-ischemic. Initial troponin level negative. BNP wnl. Plan for continuous cardiac monitoring. Continue to trend serial troponins q6 hours X 2. ASA/NTG 0.4 mg SL PRN CP. Cardiology, known to him, consulted in ED with plans for LHC on Wednesday 07/05/2017.

## 2017-07-04 NOTE — PLAN OF CARE
07/04/17 1006   Discharge Assessment   Assessment Type Discharge Planning Assessment   Confirmed/corrected address and phone number on facesheet? Yes   Assessment information obtained from? Patient   Expected Length of Stay (days) 1   Communicated expected length of stay with patient/caregiver yes   Type of Healthcare Directive Received (No healthcare directive or POA)   Prior to hospitilization cognitive status: Alert/Oriented   Prior to hospitalization functional status: Independent   Current cognitive status: Alert/Oriented   Current Functional Status: Independent   Arrived From home or self-care   Lives With sibling(s)  (Sister, Suha Beavers, [phone])   Able to Return to Prior Arrangements yes   Is patient able to care for self after discharge? Yes   How many people do you have in your home that can help with your care after discharge? 2   Who are your caregiver(s) and their phone number(s)? Sister, Suha Beavers, [phone], Ex-spouse, Lois Zamora, [phone]   Patient's perception of discharge disposition home or selfcare   Readmission Within The Last 30 Days no previous admission in last 30 days   Patient currently being followed by outpatient case management? No   Patient currently receives home health services? No   Does the patient currently use HME? No   Patient currently receives private duty nursing? No   Patient currently receives any other outside agency services? No   Equipment Currently Used at Home none   Do you have any problems affording any of your prescribed medications? No   Is the patient taking medications as prescribed? yes   Do you have any financial concerns preventing you from receiving the healthcare you need? No   Does the patient have transportation to healthcare appointments? Yes   Transportation Available car;family or friend will provide   On Dialysis? No   Does the patient receive services at the [facility]mad[facility] Clinic? No   Are there any open cases? No   Discharge Plan A Home with  "family   Discharge Plan B Home with family   Patient/Family In Agreement With Plan yes     SW explained duties of Case Management to patient. Contact information added to white board, BIANCA explained process to contact SW for any additional questions or concerns. SW also reviewed handout "discharge planning begins on admit" and "help at home" Blue folder given to patient. He was informed to leave folder at bedside and we will add any needed paperwork to folder during hospital stay.       [employer] [address] [place] [address] [address] [facility] [place] [address] [address] [place] [address]  Phone: [phone] Fax: [phone]    "

## 2017-07-04 NOTE — HOSPITAL COURSE
56 y.o. Male with a PMHx of HTN. He presented for evaluation of acute onset of exertional chest pain with associated symptoms SOB that initially began about 5 days prior to admit. Patient notes that he had a stent placed due to a 95% blockage in 2014 by Dr. Evangelista. Seen by Cardiology (Dr. Forrester), due to risk factors, who planned for LHC, premedicated with prednisone 2/2 iodine allergy, test rescheduled due to holiday. Started Statin/ASA/ACEI/BB - Continued nausea and decreased appetite, abdominal US obtained revealing Gallbladder sludge - suspected source of patient's problem - HIDA Scan obtained and surgery consulted - LHC completed showing clean arteries no CAD. Patient taken to surgery with successful laparoscopic cholecystectomy. He has ambulated in the hallway without difficulty and tolerated meals. Bowel sounds positive post surgery, abdominal dressings dry and intact without evidence of infection or drainage. Leukocytosis stable and thought to be 2/2 to steroid use - it is stable is afebrile. Pain stable - Vitals stable - anxious for discharge. Follow up with PCP, Surgery, and Cardiology in clinic.

## 2017-07-04 NOTE — ASSESSMENT & PLAN NOTE
Plan for continuous cardiac monitoring, cardiology following, due to risk factors, [facility] planned for am. NPO after midnight  ASA/ACEI/BB/statin  NTG SL PRN  Supplemental oxygen

## 2017-07-04 NOTE — PLAN OF CARE
Problem: Patient Care Overview  Goal: Plan of Care Review   07/03/17 2015   Coping/Psychosocial   Plan Of Care Reviewed With patient   Pt remained free of falls during current shift. Denied pain and did not receive any prn pain medications. Cardiology consulted and pt scheduled for angiogram on Monday. Monitoring pt's troponin which were: Results for DIAMANTE BANGURA (MRN [id number]) as of 7/4/2017 06:04   Ref. Range 7/3/2017 14:00 7/3/2017 14:29 7/3/2017 20:09 7/4/2017 01:57   Troponin I Latest Ref Range: 0.000 - 0.026 ng/mL 0.016  0.018 <0.006   Plan of care and fall precautions reviewed with pt and verbalized understanding. Bed locked, lowered, SR up x2 and call light placed within reach.

## 2017-07-04 NOTE — PROGRESS NOTES
"[facility] Hospital Medicine  Progress Note    Patient Name: Kadyen Zamora  MRN: [id number]  Patient Class: OP- Observation   Admission Date: 7/3/2017  Length of Stay: 0 days  Attending Physician: Leti Noriega MD  Primary Care Provider: Oral Gallardo MD        Subjective:     Principal Problem:Chest pain    HPI:  Kayden Zamora is a 56 y.o. male with a history as below who presented to the ED with a CC of non-radiating left sided chest pain 7/10 with associated flutters and SOB ~ 5 days.  He describes the chest pain as "tightness" intermittent with activity with each episode lasting ~ 20-30 minutes and resolving with rest. He denies fever, chills, diaphoretic, cough, BLE edema, GI/ symptoms, recent illness, trauma or any other associated symptoms. He reports he takes a ASA 81 mg daily.  Patient states he thought symptoms could be stress/anxiety so he took an old valium that he had and without relief. He also reports he had a stent place in March 2014 and endorses non-adherences to previous medications regimen after stent placement and states "I just felt like I didn't need them". Family history - mon(a) multiple stents and TIAs; dad (a) 5 vessel CABG.  He denies tobacco or ETOH use.  Found in ED to have non-ischemic EKG.  Initial troponin negative. BNP wnl. CXR is non-revealing. Dr. Forrester, crdiology, consulted in ED with plans for LHC on Wednesday.  Admitted to OBS for symptoms management. He denies recurrent CP and is non-distressed.                                                         Hospital Course:   56 y.o. Male with a PMHx of HTN. She presented for evaluation of acute onset of exertional chest pain with associated symptoms SOB that initially began about 5 days prior to admit. Patient notes that he had a stent placed due to a 95% blockage in 2014 by Dr. Evangelista. Seen by Cardiology (Dr. Forrester), due to risk factors, plans for LHC in am. Statin + ASA + ACEI + BB     Interval " History: continued nausea and decreased appetite. Intermittent CP; restless - Cardiology following - [facility] in am    Review of Systems   Constitutional: Negative for appetite change, chills, diaphoresis and fever.   HENT: Negative for congestion, hearing loss, sore throat, tinnitus and trouble swallowing.    Eyes: Negative for photophobia, discharge, itching and visual disturbance.   Respiratory: Negative for apnea, cough, wheezing and stridor.    Cardiovascular: Positive for chest pain. Negative for palpitations and leg swelling.   Gastrointestinal: Positive for nausea. Negative for abdominal distention, abdominal pain, blood in stool, constipation and diarrhea.   Endocrine: Negative for polydipsia, polyphagia and polyuria.   Genitourinary: Negative for difficulty urinating, dysuria, flank pain and frequency.   Musculoskeletal: Negative for arthralgias, joint swelling and neck stiffness.   Skin: Negative for color change, rash and wound.   Neurological: Negative for dizziness, tremors, seizures, light-headedness, numbness and headaches.   Hematological: Negative for adenopathy.   Psychiatric/Behavioral: Negative for hallucinations and self-injury.     Objective:     Vital Signs (Most Recent):  Temp: 98 °F (36.7 °C) (07/04/17 1120)  Pulse: (!) 50 (07/04/17 1120)  Resp: 18 (07/04/17 1120)  BP: 105/68 (07/04/17 1120)  SpO2: 98 % (07/04/17 1120) Vital Signs (24h Range):  Temp:  [97.6 °F (36.4 °C)-98.5 °F (36.9 °C)] 98 °F (36.7 °C)  Pulse:  [50-94] 50  Resp:  [16-18] 18  SpO2:  [95 %-100 %] 98 %  BP: (105-197)/() 105/68     Weight: 73.3 kg (161 lb 9.6 oz)  Body mass index is 23.86 kg/m².  No intake or output data in the 24 hours ending 07/04/17 1248   Physical Exam   Constitutional: He appears well-developed and well-nourished. He is cooperative.   HENT:   Head: Normocephalic and atraumatic.   Eyes: Conjunctivae and lids are normal.   Neck: Full passive range of motion without pain. Neck supple. No JVD present. No  edema present. No thyroid mass present.   Cardiovascular: S1 normal, S2 normal and intact distal pulses.    No murmur heard.  Abdominal: Soft. Bowel sounds are normal. He exhibits no distension and no abdominal bruit. There is no splenomegaly or hepatomegaly. There is no tenderness. There is no CVA tenderness.   nausea   Lymphadenopathy:     He has no cervical adenopathy.     He has no axillary adenopathy.   Neurological: He is alert. He has normal reflexes. He displays no tremor. He displays no seizure activity.   Skin: Skin is warm, dry and intact.   Psychiatric: He has a normal mood and affect. His speech is normal. Thought content normal. Cognition and memory are normal.       Significant Labs:   BMP:   Recent Labs  Lab 07/04/17 0423         K 3.7      CO2 28   BUN 22*   CREATININE 1.0   CALCIUM 9.9     CBC:   Recent Labs  Lab 07/03/17 1400 07/04/17  0423   WBC 10.44 9.24   HGB 15.3 15.2   HCT 43.9 45.0   * 330     Cardiac Markers:   Recent Labs  Lab 07/03/17  1400   BNP 27     Coagulation:   Recent Labs  Lab 07/03/17  1400   INR 1.3*     Lipase: No results for input(s): LIPASE in the last 48 hours.  Lipid Panel:   Recent Labs  Lab 07/04/17 0423   CHOL 258*   HDL 43   LDLCALC 189.4*   TRIG 128   CHOLHDL 16.7*     Magnesium: No results for input(s): MG in the last 48 hours.  Troponin:   Recent Labs  Lab 07/03/17 1400 07/03/17 2009 07/04/17  0157   TROPONINI 0.016 0.018 <0.006     TSH:   Recent Labs  Lab 07/04/17 0423   TSH 0.370*     Urine Culture: No results for input(s): LABURIN in the last 48 hours.    Significant Imaging:   Imaging Results          X-Ray Chest PA And Lateral (Final result)  Result time 07/03/17 14:43:47    Final result by Mando Shearer MD (07/03/17 14:43:47)                 Impression:        No evidence of active cardiopulmonary disease.      Electronically signed by: MANDO SHEARER MD  Date:     07/03/17  Time:    14:43              Narrative:    XR  Chest    07/03/17 14:03:31    Accession# [id number]    CLINICAL INDICATION: 56 year old M with Chest Pain     TECHNIQUE: PA and lateral radiographs of the chest.    COMPARISON:  No priors.    FINDINGS:    The cardiomediastinal silhouette is normal in size and midline. Pulmonary vascularity appears within normal limits.    The lungs appear clear without confluent pulmonary parenchymal opacity. No pleural fluid.    Osseous structures appear intact. Lower cervical ACDF.                                Assessment/Plan:      Essential hypertension    Decent control - continue current regimen            History of heart artery stent    See above          * Chest pain    Plan for continuous cardiac monitoring, cardiology following, due to risk factors, [facility] planned for am. NPO after midnight  ASA/ACEI/BB/statin  NTG SL PRN  Supplemental oxygen              VTE Risk Mitigation         Ordered     enoxaparin injection 40 mg  Daily     Route:  Subcutaneous        07/03/17 1744     Medium Risk of VTE  Once      07/03/17 1741              MEGHAN Lima, FNP-C  PA# [id number]  NPI# [id number]  Hospitalist - Department of Hospital Medicine  [address] [place] [address]  Office [phone]; Pager [phone]

## 2017-07-04 NOTE — CONSULTS
Consult dictated through our office dictation as the [facility] dictation system is nonfunctional.  The consult will be placed on the paper chart in the am.    CAD with unstable angina  S/P circumflex stent 2014  Hyperlipidemia  Hypertension    Add imdur  Increase lisinopril  Add norvasc  Plan angiogram/possible PCI tomorrow.   Procedure and risks including death, stroke, MI, bleeding, embolus, allergic reaction, arterial injury, emergency surgery explained and he is willing to proceed.  Will need pretreatment for contrast allergy.

## 2017-07-04 NOTE — ASSESSMENT & PLAN NOTE
Reports [facility]. Patient reports he was on ?plavix and metoprolol, however, has since taken himself off the medications (maybe 1.5 years ago). Also reports he has not followed up with cards.   I will restated low dose BB and defer further management to cardiology who has been consulted on case.

## 2017-07-05 PROBLEM — K82.8 GALLBLADDER SLUDGE: Status: ACTIVE | Noted: 2017-07-05

## 2017-07-05 LAB
ALBUMIN SERPL BCP-MCNC: 3.8 G/DL
ALP SERPL-CCNC: 70 U/L
ALT SERPL W/O P-5'-P-CCNC: 5 U/L
AMORPH CRY URNS QL MICRO: NORMAL
ANION GAP SERPL CALC-SCNC: 7 MMOL/L
AST SERPL-CCNC: 13 U/L
BASOPHILS # BLD AUTO: 0.02 K/UL
BASOPHILS NFR BLD: 0.1 %
BILIRUB SERPL-MCNC: 0.6 MG/DL
BILIRUB UR QL STRIP: NEGATIVE
BUN SERPL-MCNC: 23 MG/DL
CALCIUM SERPL-MCNC: 9.8 MG/DL
CHLORIDE SERPL-SCNC: 103 MMOL/L
CLARITY UR: ABNORMAL
CO2 SERPL-SCNC: 28 MMOL/L
COLOR UR: YELLOW
CREAT SERPL-MCNC: 1.1 MG/DL
DIFFERENTIAL METHOD: ABNORMAL
EOSINOPHIL # BLD AUTO: 0 K/UL
EOSINOPHIL NFR BLD: 0 %
ERYTHROCYTE [DISTWIDTH] IN BLOOD BY AUTOMATED COUNT: 13.6 %
EST. GFR  (AFRICAN AMERICAN): >60 ML/MIN/1.73 M^2
EST. GFR  (NON AFRICAN AMERICAN): >60 ML/MIN/1.73 M^2
GLUCOSE SERPL-MCNC: 163 MG/DL
GLUCOSE UR QL STRIP: ABNORMAL
HCT VFR BLD AUTO: 41.5 %
HGB BLD-MCNC: 14.5 G/DL
HGB UR QL STRIP: ABNORMAL
KETONES UR QL STRIP: ABNORMAL
LEUKOCYTE ESTERASE UR QL STRIP: NEGATIVE
LYMPHOCYTES # BLD AUTO: 1.4 K/UL
LYMPHOCYTES NFR BLD: 10.3 %
MCH RBC QN AUTO: 33 PG
MCHC RBC AUTO-ENTMCNC: 34.9 %
MCV RBC AUTO: 94 FL
MICROSCOPIC COMMENT: NORMAL
MONOCYTES # BLD AUTO: 0.5 K/UL
MONOCYTES NFR BLD: 3.7 %
NEUTROPHILS # BLD AUTO: 11.7 K/UL
NEUTROPHILS NFR BLD: 85.6 %
NITRITE UR QL STRIP: NEGATIVE
PH UR STRIP: 5 [PH] (ref 5–8)
PLATELET # BLD AUTO: 371 K/UL
PMV BLD AUTO: 9.3 FL
POTASSIUM SERPL-SCNC: 4.3 MMOL/L
PROT SERPL-MCNC: 7.7 G/DL
PROT UR QL STRIP: NEGATIVE
RBC # BLD AUTO: 4.4 M/UL
RBC #/AREA URNS HPF: 0 /HPF (ref 0–4)
SODIUM SERPL-SCNC: 138 MMOL/L
SP GR UR STRIP: 1.03 (ref 1–1.03)
URN SPEC COLLECT METH UR: ABNORMAL
UROBILINOGEN UR STRIP-ACNC: NEGATIVE EU/DL
WBC # BLD AUTO: 13.68 K/UL

## 2017-07-05 PROCEDURE — 63600175 PHARM REV CODE 636 W HCPCS: Performed by: NURSE PRACTITIONER

## 2017-07-05 PROCEDURE — 63600175 PHARM REV CODE 636 W HCPCS: Performed by: EMERGENCY MEDICINE

## 2017-07-05 PROCEDURE — 25000003 PHARM REV CODE 250: Performed by: INTERNAL MEDICINE

## 2017-07-05 PROCEDURE — 25000003 PHARM REV CODE 250: Performed by: NURSE PRACTITIONER

## 2017-07-05 PROCEDURE — G0378 HOSPITAL OBSERVATION PER HR: HCPCS

## 2017-07-05 PROCEDURE — 85025 COMPLETE CBC W/AUTO DIFF WBC: CPT

## 2017-07-05 PROCEDURE — 80053 COMPREHEN METABOLIC PANEL: CPT

## 2017-07-05 PROCEDURE — 63600175 PHARM REV CODE 636 W HCPCS: Performed by: INTERNAL MEDICINE

## 2017-07-05 PROCEDURE — 36415 COLL VENOUS BLD VENIPUNCTURE: CPT

## 2017-07-05 PROCEDURE — 25000003 PHARM REV CODE 250: Performed by: EMERGENCY MEDICINE

## 2017-07-05 PROCEDURE — 81000 URINALYSIS NONAUTO W/SCOPE: CPT

## 2017-07-05 RX ORDER — PREDNISONE 20 MG/1
40 TABLET ORAL ONCE
Status: COMPLETED | OUTPATIENT
Start: 2017-07-05 | End: 2017-07-05

## 2017-07-05 RX ORDER — PREDNISONE 20 MG/1
40 TABLET ORAL ONCE
Status: COMPLETED | OUTPATIENT
Start: 2017-07-06 | End: 2017-07-06

## 2017-07-05 RX ADMIN — DIPHENHYDRAMINE HYDROCHLORIDE 50 MG: 50 CAPSULE ORAL at 05:07

## 2017-07-05 RX ADMIN — HYDROCODONE BITARTRATE AND ACETAMINOPHEN 1 TABLET: 10; 325 TABLET ORAL at 01:07

## 2017-07-05 RX ADMIN — PREDNISONE 40 MG: 20 TABLET ORAL at 06:07

## 2017-07-05 RX ADMIN — Medication 3 ML: at 09:07

## 2017-07-05 RX ADMIN — FAMOTIDINE 20 MG: 20 TABLET, FILM COATED ORAL at 09:07

## 2017-07-05 RX ADMIN — PREDNISONE 60 MG: 20 TABLET ORAL at 05:07

## 2017-07-05 RX ADMIN — RAMELTEON 8 MG: 8 TABLET, FILM COATED ORAL at 01:07

## 2017-07-05 RX ADMIN — METOPROLOL TARTRATE 25 MG: 25 TABLET ORAL at 09:07

## 2017-07-05 RX ADMIN — ALPRAZOLAM 0.5 MG: 0.5 TABLET ORAL at 09:07

## 2017-07-05 RX ADMIN — ALPRAZOLAM 0.5 MG: 0.5 TABLET ORAL at 01:07

## 2017-07-05 RX ADMIN — ATORVASTATIN CALCIUM 80 MG: 40 TABLET, FILM COATED ORAL at 09:07

## 2017-07-05 RX ADMIN — ASPIRIN 81 MG: 81 TABLET, COATED ORAL at 09:07

## 2017-07-05 RX ADMIN — Medication 3 ML: at 01:07

## 2017-07-05 RX ADMIN — ISOSORBIDE MONONITRATE 60 MG: 30 TABLET, EXTENDED RELEASE ORAL at 09:07

## 2017-07-05 RX ADMIN — ALPRAZOLAM 0.5 MG: 0.5 TABLET ORAL at 05:07

## 2017-07-05 RX ADMIN — LISINOPRIL 20 MG: 20 TABLET ORAL at 09:07

## 2017-07-05 RX ADMIN — ENOXAPARIN SODIUM 40 MG: 100 INJECTION SUBCUTANEOUS at 06:07

## 2017-07-05 RX ADMIN — Medication 3 ML: at 05:07

## 2017-07-05 RX ADMIN — METOPROLOL TARTRATE 25 MG: 25 TABLET ORAL at 08:07

## 2017-07-05 RX ADMIN — ONDANSETRON 4 MG: 2 INJECTION INTRAMUSCULAR; INTRAVENOUS at 09:07

## 2017-07-05 RX ADMIN — AMLODIPINE BESYLATE 5 MG: 5 TABLET ORAL at 09:07

## 2017-07-05 NOTE — ASSESSMENT & PLAN NOTE
LHC delayed - Continue, cardiac monitoring, cardiology following, due to risk factors, LHC planned for am.   NPO after midnight  ASA/ACEI/BB/statin  NTG SL PRN  Supplemental oxygen

## 2017-07-05 NOTE — PROGRESS NOTES
Dr Estes spoke to patient re: Heart Cath on 7/6 pending results of HIDA scan tonight. If HIDA is negative will proceed with heart cath.

## 2017-07-05 NOTE — PROGRESS NOTES
Progress Note  Cardiology    Admit Date: 7/3/2017   LOS: 0 days     Follow-up For:  Chest pain    Review of patient's allergies indicates:   Allergen Reactions    Iodine and iodide containing products Hives       SUBJECTIVE:     Interval History: Patient has no complaint of chest pain.  Due to need for premedication for dye allergy, case postponed until tomorrow AM.    OBJECTIVE:     Vital Signs (Most Recent)  Temp: 97.7 °F (36.5 °C) (07/05/17 1600)  Pulse: 71 (07/05/17 1600)  Resp: 18 (07/05/17 1600)  BP: 103/60 (07/05/17 1600)  SpO2: 96 % (07/05/17 1600)    Vital Signs Range (Last 24H):  Temp:  [97.7 °F (36.5 °C)-98.8 °F (37.1 °C)]   Pulse:  []   Resp:  [18]   BP: ()/()   SpO2:  [96 %-98 %]     I & O (Last 24H):  Intake/Output Summary (Last 24 hours) at 07/05/17 1727  Last data filed at 07/05/17 1400   Gross per 24 hour   Intake              720 ml   Output                0 ml   Net              720 ml     Physical Exam:  Lungs: clear to auscultation bilaterally, normal respiratory effort  Heart: regular rate and rhythm, S1, S2 normal, no murmur, click, rub or gallop  Extremities: Extremities normal, atraumatic, no cyanosis, clubbing, or edema    Laboratory:  Chemistry:  Lab Results   Component Value Date     07/05/2017    K 4.3 07/05/2017     07/05/2017    CO2 28 07/05/2017    BUN 23 (H) 07/05/2017    CREATININE 1.1 07/05/2017    CALCIUM 9.8 07/05/2017    BNP 27 07/03/2017     Cardiac Markers (Last 3):  Lab Results   Component Value Date    TROPONINI <0.006 07/04/2017    TROPONINI 0.018 07/03/2017    TROPONINI 0.016 07/03/2017     CBC:   Lab Results   Component Value Date    WBC 13.68 (H) 07/05/2017    HGB 14.5 07/05/2017    HCT 41.5 07/05/2017    MCV 94 07/05/2017     (H) 07/05/2017     Lipids:  Lab Results   Component Value Date    CHOL 258 (H) 07/04/2017    TRIG 128 07/04/2017    HDL 43 07/04/2017     Coagulation:   Lab Results   Component Value Date    INR 1.3 (H)  07/03/2017       Diagnostic Results:  Labs: Reviewed     Scheduled Meds:   alprazolam  0.5 mg Oral TID    amlodipine  5 mg Oral Daily    aspirin  81 mg Oral Daily    atorvastatin  80 mg Oral Daily    enoxaparin  40 mg Subcutaneous Daily    famotidine  20 mg Oral Daily    isosorbide mononitrate  60 mg Oral Daily    lisinopril  20 mg Oral Daily    metoprolol tartrate  25 mg Oral BID    predniSONE  40 mg Oral Once    [START ON 7/6/2017] predniSONE  40 mg Oral Once    senna-docusate 8.6-50 mg  1 tablet Oral BID    sodium chloride 0.9%  3 mL Intravenous Q8H     Continuous Infusions:   PRN Meds:acetaminophen, hydrALAZINE, hydrocodone-acetaminophen 10-325mg, nitroGLYCERIN, ondansetron, ramelteon    ASSESSMENT/PLAN:     Patient Active Problem List   Diagnosis    Chest pain    History of heart artery stent    Essential hypertension    Anxiety    Gallbladder sludge       Plan: Plan for angiography in AM.

## 2017-07-05 NOTE — SUBJECTIVE & OBJECTIVE
Interval History: continued nausea with Zofran; suspect GI etiology - obtained abdominal US that was significant for gallbladder sludge - HIDA scan    Review of Systems   Constitutional: Negative for chills, diaphoresis and fever.   Respiratory: Positive for shortness of breath. Negative for cough, chest tightness and wheezing.    Cardiovascular: Positive for chest pain. Negative for palpitations and leg swelling.   Gastrointestinal: Positive for abdominal pain and nausea. Negative for abdominal distention, constipation, diarrhea and vomiting.   Genitourinary: Negative for dysuria and hematuria.   Musculoskeletal: Negative for joint swelling and neck stiffness.     Objective:     Vital Signs (Most Recent):  Temp: 98.8 °F (37.1 °C) (07/05/17 1140)  Pulse: 60 (07/05/17 1140)  Resp: 18 (07/05/17 1140)  BP: (!) 142/73 (07/05/17 1140)  SpO2: 97 % (07/05/17 1140) Vital Signs (24h Range):  Temp:  [98.1 °F (36.7 °C)-98.8 °F (37.1 °C)] 98.8 °F (37.1 °C)  Pulse:  [] 60  Resp:  [18] 18  SpO2:  [97 %-98 %] 97 %  BP: ()/() 142/73     Weight: 72.7 kg (160 lb 3.2 oz)  Body mass index is 23.66 kg/m².    Intake/Output Summary (Last 24 hours) at 07/05/17 1538  Last data filed at 07/05/17 1400   Gross per 24 hour   Intake              720 ml   Output                0 ml   Net              720 ml      Physical Exam   Constitutional: He is oriented to person, place, and time. He appears well-developed and well-nourished. No distress.   HENT:   Head: Normocephalic and atraumatic.   Cardiovascular: Normal rate and regular rhythm.    Pulmonary/Chest: Effort normal. No respiratory distress. He has no wheezes. He has no rales.   Abdominal: Soft. Bowel sounds are normal. He exhibits no distension. There is no tenderness. There is guarding.   guarding   Genitourinary: Rectal exam shows guaiac negative stool.   Musculoskeletal: Normal range of motion. He exhibits no edema or tenderness.   Neurological: He is alert and oriented  to person, place, and time.   Skin: He is not diaphoretic.       Significant Labs:   BMP:   Recent Labs  Lab 07/05/17  0444   *      K 4.3      CO2 28   BUN 23*   CREATININE 1.1   CALCIUM 9.8     Cardiac Markers: No results for input(s): CKMB, MYOGLOBIN, BNP, TROPISTAT in the last 48 hours.  Coagulation: No results for input(s): INR, APTT in the last 48 hours.    Invalid input(s): PT  Lactic Acid: No results for input(s): LACTATE in the last 48 hours.  Lipase: No results for input(s): LIPASE in the last 48 hours.  Lipid Panel:   Recent Labs  Lab 07/04/17  0423   CHOL 258*   HDL 43   LDLCALC 189.4*   TRIG 128   CHOLHDL 16.7*     Troponin:   Recent Labs  Lab 07/03/17 2009 07/04/17  0157   TROPONINI 0.018 <0.006     TSH:   Recent Labs  Lab 07/04/17  0423   TSH 0.370*     Urine Culture: No results for input(s): LABURIN in the last 48 hours.    Significant Imaging:   Imaging Results          US Abdomen Complete (Final result)  Result time 07/05/17 14:31:49    Final result by Niko Miles MD (07/05/17 14:31:49)                 Impression:     Gallbladder sludge.      Electronically signed by: NIKO MILES MD  Date:     07/05/17  Time:    14:31              Narrative:    Abdominal ultrasound    Comparison: Not available.    Findings:    Liver measures 13.8 cm in craniocaudal dimension.  Parenchymal echogenicity is unremarkable.  There are no hepatic masses seen.    There is no intrahepatic biliary dilatation.  Common bile duct measures 3 mm, within normal limits.  Gallbladder sludge noted.  No wall thickening or pericholecystic fluid.  Sonographic Tavarez sign is negative.      Visualized pancreas is unremarkable.    Right kidney measures 10.8 cm and appears unremarkable. No hydronephrosis.    Left kidney measures 10.1 cm and appears unremarkable. No hydronephrosis.    Spleen measures 8.0 cm and appears unremarkable.    Abdominal aorta is normal caliber. IVC is unremarkable.                              X-Ray Chest PA And Lateral (Final result)  Result time 07/03/17 14:43:47    Final result by Mando Shearer MD (07/03/17 14:43:47)                 Impression:        No evidence of active cardiopulmonary disease.      Electronically signed by: MANDO SHEARER MD  Date:     07/03/17  Time:    14:43              Narrative:    XR Chest    07/03/17 14:03:31    Accession# [id number]    CLINICAL INDICATION: 56 year old M with Chest Pain     TECHNIQUE: PA and lateral radiographs of the chest.    COMPARISON:  No priors.    FINDINGS:    The cardiomediastinal silhouette is normal in size and midline. Pulmonary vascularity appears within normal limits.    The lungs appear clear without confluent pulmonary parenchymal opacity. No pleural fluid.    Osseous structures appear intact. Lower cervical ACDF.

## 2017-07-05 NOTE — PROGRESS NOTES
WRITTEN HEALTHCARE DISCHARGE INFORMATION     Things that YOU are responsible for to Manage Your Care At Home:  1. Getting your prescriptions filled.  2. Taking you medications as directed. DO NOT MISS ANY DOSES!  3. Going to your follow-up doctor appointments. This is important because it allows the doctor to monitor your progress and to determine if any changes need to be made to your treatment plan.    If you are unable to make your follow up appointments, please call the number listed and reschedule this appointment.     After discharge, if you need assistance, you can call [facility] On Call Nurse Care Line for 24/7 assistance at [phone]    Thank you for choosing [facility] and allowing us to care for you.   From your care management team: Keiry VICENTE,RSW & Naila VASQUEZ RN,TN [phone] or [phone]     You should receive a call from [facility] Discharge Department within 48-72 hours to help manage your care after discharge. Please try to make sure that you answer your phone for this important phone call.     Follow-up Information     Oral Gallardo MD. Go on 7/11/2017.    Specialty:  Internal Medicine  Why:  at 9am. Follow up with Primary Care. Please bring ID, Insurance Cards, and Arrive 15mins early.  Contact information:  [address] [place] [address] [phone]             Ashanti Evangelista MD. Go on 7/13/2017.    Specialty:  Cardiology  Why:  at 2pm. Hospital Follow Up appointment with Cards.  Contact information:  [address]  [facility] [id number] [phone]

## 2017-07-05 NOTE — H&P
"[facility] Medicine  History & Physical    Patient Name: Kayden Zamora  MRN: [id number]  Admission Date: 7/3/2017  Attending Physician: Leti Noriega MD   Primary Care Provider: Oral Gallardo MD         Patient information was obtained from patient and ER records.     Subjective:     Principal Problem:Chest pain    Chief Complaint:   Chief Complaint   Patient presents with    Chest Pain     pt states left sided cp with sob times 3 days         HPI: Kayden Zamora is a 56 y.o. male with a history as below who presented to the ED with a CC of non-radiating left sided chest pain 7/10 with associated flutters and SOB ~ 5 days.  He describes the chest pain as "tightness" intermittent with activity with each episode lasting ~ 20-30 minutes and resolving with rest. He denies fever, chills, diaphoretic, cough, BLE edema, GI/ symptoms, recent illness, trauma or any other associated symptoms. He reports he takes a ASA 81 mg daily.  Patient states he thought symptoms could be stress/anxiety so he took an old valium that he had and without relief. He also reports he had a stent place in March 2014 and endorses non-adherences to previous medications regimen after stent placement and states "I just felt like I didn't need them". Family history - mon(a) multiple stents and TIAs; dad (a) 5 vessel CABG.  He denies tobacco or ETOH use.  Found in ED to have non-ischemic EKG.  Initial troponin negative. BNP wnl. CXR is non-revealing. Dr. Forrester, crdiology, consulted in ED with plans for LHC on Wednesday.  Admitted to OBS for symptoms management. He denies recurrent CP and is non-distressed.                                                         Interval History: continued nausea with Zofran; suspect GI etiology - obtained abdominal US that was significant for gallbladder sludge - HIDA scan    Review of Systems   Constitutional: Negative for chills, diaphoresis and fever.   Respiratory: Positive " for shortness of breath. Negative for cough, chest tightness and wheezing.    Cardiovascular: Positive for chest pain. Negative for palpitations and leg swelling.   Gastrointestinal: Positive for abdominal pain and nausea. Negative for abdominal distention, constipation, diarrhea and vomiting.   Genitourinary: Negative for dysuria and hematuria.   Musculoskeletal: Negative for joint swelling and neck stiffness.     Objective:     Vital Signs (Most Recent):  Temp: 98.8 °F (37.1 °C) (07/05/17 1140)  Pulse: 60 (07/05/17 1140)  Resp: 18 (07/05/17 1140)  BP: (!) 142/73 (07/05/17 1140)  SpO2: 97 % (07/05/17 1140) Vital Signs (24h Range):  Temp:  [98.1 °F (36.7 °C)-98.8 °F (37.1 °C)] 98.8 °F (37.1 °C)  Pulse:  [] 60  Resp:  [18] 18  SpO2:  [97 %-98 %] 97 %  BP: ()/() 142/73     Weight: 72.7 kg (160 lb 3.2 oz)  Body mass index is 23.66 kg/m².    Intake/Output Summary (Last 24 hours) at 07/05/17 1538  Last data filed at 07/05/17 1400   Gross per 24 hour   Intake              720 ml   Output                0 ml   Net              720 ml      Physical Exam   Constitutional: He is oriented to person, place, and time. He appears well-developed and well-nourished. No distress.   HENT:   Head: Normocephalic and atraumatic.   Cardiovascular: Normal rate and regular rhythm.    Pulmonary/Chest: Effort normal. No respiratory distress. He has no wheezes. He has no rales.   Abdominal: Soft. Bowel sounds are normal. He exhibits no distension. There is no tenderness. There is guarding.   guarding   Genitourinary: Rectal exam shows guaiac negative stool.   Musculoskeletal: Normal range of motion. He exhibits no edema or tenderness.   Neurological: He is alert and oriented to person, place, and time.   Skin: He is not diaphoretic.       Significant Labs:   BMP:   Recent Labs  Lab 07/05/17  0444   *      K 4.3      CO2 28   BUN 23*   CREATININE 1.1   CALCIUM 9.8     Cardiac Markers: No results for  input(s): CKMB, MYOGLOBIN, BNP, TROPISTAT in the last 48 hours.  Coagulation: No results for input(s): INR, APTT in the last 48 hours.    Invalid input(s): PT  Lactic Acid: No results for input(s): LACTATE in the last 48 hours.  Lipase: No results for input(s): LIPASE in the last 48 hours.  Lipid Panel:   Recent Labs  Lab 07/04/17  0423   CHOL 258*   HDL 43   LDLCALC 189.4*   TRIG 128   CHOLHDL 16.7*     Troponin:   Recent Labs  Lab 07/03/17 2009 07/04/17  0157   TROPONINI 0.018 <0.006     TSH:   Recent Labs  Lab 07/04/17  0423   TSH 0.370*     Urine Culture: No results for input(s): LABURIN in the last 48 hours.    Significant Imaging:   Imaging Results          US Abdomen Complete (Final result)  Result time 07/05/17 14:31:49    Final result by Niko Miles MD (07/05/17 14:31:49)                 Impression:     Gallbladder sludge.      Electronically signed by: NIKO MILES MD  Date:     07/05/17  Time:    14:31              Narrative:    Abdominal ultrasound    Comparison: Not available.    Findings:    Liver measures 13.8 cm in craniocaudal dimension.  Parenchymal echogenicity is unremarkable.  There are no hepatic masses seen.    There is no intrahepatic biliary dilatation.  Common bile duct measures 3 mm, within normal limits.  Gallbladder sludge noted.  No wall thickening or pericholecystic fluid.  Sonographic Tavarez sign is negative.      Visualized pancreas is unremarkable.    Right kidney measures 10.8 cm and appears unremarkable. No hydronephrosis.    Left kidney measures 10.1 cm and appears unremarkable. No hydronephrosis.    Spleen measures 8.0 cm and appears unremarkable.    Abdominal aorta is normal caliber. IVC is unremarkable.                             X-Ray Chest PA And Lateral (Final result)  Result time 07/03/17 14:43:47    Final result by Siddharth Norwood MD (07/03/17 14:43:47)                 Impression:        No evidence of active cardiopulmonary disease.      Electronically signed  by: MANDO SHEARER MD  Date:     07/03/17  Time:    14:43              Narrative:    XR Chest    07/03/17 14:03:31    Accession# [id number]    CLINICAL INDICATION: 56 year old M with Chest Pain     TECHNIQUE: PA and lateral radiographs of the chest.    COMPARISON:  No priors.    FINDINGS:    The cardiomediastinal silhouette is normal in size and midline. Pulmonary vascularity appears within normal limits.    The lungs appear clear without confluent pulmonary parenchymal opacity. No pleural fluid.    Osseous structures appear intact. Lower cervical ACDF.                                Assessment/Plan:     * Chest pain    LHC delayed - Continue, cardiac monitoring, cardiology following, due to risk factors, [facility] planned for am.   NPO after midnight  ASA/ACEI/BB/statin  NTG SL PRN  Supplemental oxygen        Gallbladder sludge    Continued nausea - now with leukocytosis; normal transaminates - CP suspicious for GI source of patient's intermittent CP and nausea, abdominal US significant for gallbladder sludge  -HIDA scan  -Consider surgery consult  -IP consult to surgery          Anxiety    Xanax home dose - continue          Essential hypertension    BP with spikes - elevated intermittenly, gallbladder and pain likely contributory; continue current regimen            History of heart artery stent    See above            VTE Risk Mitigation         Ordered     enoxaparin injection 40 mg  Daily     Route:  Subcutaneous        07/03/17 1744     Medium Risk of VTE  Once      07/03/17 1741            MEGHAN Lima, FNP-C  PA# [id number]  NPI# [id number]  Hospitalist - Department of Hospital Medicine  [address] [place] [address]  Office [phone]; Pager [phone]

## 2017-07-05 NOTE — ASSESSMENT & PLAN NOTE
BP with spikes - elevated intermittenly, gallbladder and pain likely contributory; continue current regimen

## 2017-07-05 NOTE — PROGRESS NOTES
TN attempted to complete discharge teaching with pt, however, pt is off of the unit in the cath lab.

## 2017-07-05 NOTE — PROGRESS NOTES
2115 Received call from [employer]. Patient's heart rate is 140-150. Upon entering room, patient is ambulating from bathroom. He reports feeling palpitations and shortness of breathe. Patient returned to bed and placed on 2 liters ofoxygen. Heart decreased to .     2300 blood pressure is 90/50. Heart rate 110's. MD notified. Metoprolol held per orders. 1 liter bolus of normal saline ordered.    115 Bolus complete. Blood pressure is 107/69 and heart is 70-90. Patient denies and discomforts at this time. Will continue to monitor.

## 2017-07-05 NOTE — ASSESSMENT & PLAN NOTE
Continued nausea - now with leukocytosis; normal transaminates - CP suspicious for GI source of patient's intermittent CP and nausea, abdominal US significant for gallbladder sludge  -HIDA scan  -Consider surgery consult  -IP consult to surgery

## 2017-07-06 ENCOUNTER — ANESTHESIA (OUTPATIENT)
Dept: SURGERY | Facility: HOSPITAL | Age: 57
End: 2017-07-06
Payer: COMMERCIAL

## 2017-07-06 ENCOUNTER — ANESTHESIA EVENT (OUTPATIENT)
Dept: SURGERY | Facility: HOSPITAL | Age: 57
End: 2017-07-06
Payer: COMMERCIAL

## 2017-07-06 PROBLEM — K81.2 ACUTE CHOLECYSTITIS WITH CHRONIC CHOLECYSTITIS: Status: ACTIVE | Noted: 2017-07-06

## 2017-07-06 LAB
ALBUMIN SERPL BCP-MCNC: 3.9 G/DL
ALP SERPL-CCNC: 67 U/L
ALT SERPL W/O P-5'-P-CCNC: <5 U/L
ANION GAP SERPL CALC-SCNC: 8 MMOL/L
AST SERPL-CCNC: 10 U/L
BASOPHILS # BLD AUTO: 0.01 K/UL
BASOPHILS NFR BLD: 0.1 %
BILIRUB SERPL-MCNC: 0.6 MG/DL
BUN SERPL-MCNC: 25 MG/DL
CALCIUM SERPL-MCNC: 10.2 MG/DL
CHLORIDE SERPL-SCNC: 102 MMOL/L
CO2 SERPL-SCNC: 29 MMOL/L
CREAT SERPL-MCNC: 1.1 MG/DL
DIFFERENTIAL METHOD: ABNORMAL
EOSINOPHIL # BLD AUTO: 0 K/UL
EOSINOPHIL NFR BLD: 0 %
ERYTHROCYTE [DISTWIDTH] IN BLOOD BY AUTOMATED COUNT: 13.6 %
EST. GFR  (AFRICAN AMERICAN): >60 ML/MIN/1.73 M^2
EST. GFR  (NON AFRICAN AMERICAN): >60 ML/MIN/1.73 M^2
GLUCOSE SERPL-MCNC: 136 MG/DL
HCT VFR BLD AUTO: 41.3 %
HGB BLD-MCNC: 14.3 G/DL
LYMPHOCYTES # BLD AUTO: 2.3 K/UL
LYMPHOCYTES NFR BLD: 14.4 %
MCH RBC QN AUTO: 32.7 PG
MCHC RBC AUTO-ENTMCNC: 34.6 %
MCV RBC AUTO: 95 FL
MONOCYTES # BLD AUTO: 1.8 K/UL
MONOCYTES NFR BLD: 11.5 %
NEUTROPHILS # BLD AUTO: 11.6 K/UL
NEUTROPHILS NFR BLD: 73.8 %
PLATELET # BLD AUTO: 388 K/UL
PMV BLD AUTO: 9.5 FL
POTASSIUM SERPL-SCNC: 4.4 MMOL/L
PROT SERPL-MCNC: 7.7 G/DL
RBC # BLD AUTO: 4.37 M/UL
SODIUM SERPL-SCNC: 139 MMOL/L
WBC # BLD AUTO: 15.72 K/UL

## 2017-07-06 PROCEDURE — 85025 COMPLETE CBC W/AUTO DIFF WBC: CPT

## 2017-07-06 PROCEDURE — 25000003 PHARM REV CODE 250: Performed by: SURGERY

## 2017-07-06 PROCEDURE — 88304 TISSUE EXAM BY PATHOLOGIST: CPT | Mod: 26,,, | Performed by: PATHOLOGY

## 2017-07-06 PROCEDURE — 25000003 PHARM REV CODE 250

## 2017-07-06 PROCEDURE — 63600175 PHARM REV CODE 636 W HCPCS: Performed by: PHYSICIAN ASSISTANT

## 2017-07-06 PROCEDURE — 25000003 PHARM REV CODE 250: Performed by: NURSE PRACTITIONER

## 2017-07-06 PROCEDURE — 88304 TISSUE EXAM BY PATHOLOGIST: CPT | Performed by: PATHOLOGY

## 2017-07-06 PROCEDURE — 36000709 HC OR TIME LEV III EA ADD 15 MIN: Performed by: SURGERY

## 2017-07-06 PROCEDURE — 27201423 OPTIME MED/SURG SUP & DEVICES STERILE SUPPLY: Performed by: SURGERY

## 2017-07-06 PROCEDURE — 25000003 PHARM REV CODE 250: Performed by: NURSE ANESTHETIST, CERTIFIED REGISTERED

## 2017-07-06 PROCEDURE — 36415 COLL VENOUS BLD VENIPUNCTURE: CPT

## 2017-07-06 PROCEDURE — 80053 COMPREHEN METABOLIC PANEL: CPT

## 2017-07-06 PROCEDURE — 25000003 PHARM REV CODE 250: Performed by: INTERNAL MEDICINE

## 2017-07-06 PROCEDURE — 63600175 PHARM REV CODE 636 W HCPCS: Performed by: INTERNAL MEDICINE

## 2017-07-06 PROCEDURE — 37000008 HC ANESTHESIA 1ST 15 MINUTES: Performed by: SURGERY

## 2017-07-06 PROCEDURE — 63600175 PHARM REV CODE 636 W HCPCS: Performed by: NURSE ANESTHETIST, CERTIFIED REGISTERED

## 2017-07-06 PROCEDURE — 63600175 PHARM REV CODE 636 W HCPCS

## 2017-07-06 PROCEDURE — 63600175 PHARM REV CODE 636 W HCPCS: Performed by: ANESTHESIOLOGY

## 2017-07-06 PROCEDURE — 25000003 PHARM REV CODE 250: Performed by: EMERGENCY MEDICINE

## 2017-07-06 PROCEDURE — 37000009 HC ANESTHESIA EA ADD 15 MINS: Performed by: SURGERY

## 2017-07-06 PROCEDURE — 71000039 HC RECOVERY, EACH ADD'L HOUR: Performed by: SURGERY

## 2017-07-06 PROCEDURE — D9220A PRA ANESTHESIA: Mod: ,,, | Performed by: ANESTHESIOLOGY

## 2017-07-06 PROCEDURE — 71000033 HC RECOVERY, INTIAL HOUR: Performed by: SURGERY

## 2017-07-06 PROCEDURE — G0378 HOSPITAL OBSERVATION PER HR: HCPCS

## 2017-07-06 PROCEDURE — 36000708 HC OR TIME LEV III 1ST 15 MIN: Performed by: SURGERY

## 2017-07-06 PROCEDURE — 99152 MOD SED SAME PHYS/QHP 5/>YRS: CPT

## 2017-07-06 RX ORDER — SODIUM CHLORIDE 0.9 % (FLUSH) 0.9 %
3 SYRINGE (ML) INJECTION
Status: DISCONTINUED | OUTPATIENT
Start: 2017-07-06 | End: 2017-07-07 | Stop reason: HOSPADM

## 2017-07-06 RX ORDER — FENTANYL CITRATE 50 UG/ML
25 INJECTION, SOLUTION INTRAMUSCULAR; INTRAVENOUS EVERY 5 MIN PRN
Status: DISCONTINUED | OUTPATIENT
Start: 2017-07-06 | End: 2017-07-07 | Stop reason: HOSPADM

## 2017-07-06 RX ORDER — HYDROMORPHONE HYDROCHLORIDE 2 MG/ML
0.2 INJECTION, SOLUTION INTRAMUSCULAR; INTRAVENOUS; SUBCUTANEOUS EVERY 5 MIN PRN
Status: DISCONTINUED | OUTPATIENT
Start: 2017-07-06 | End: 2017-07-07 | Stop reason: HOSPADM

## 2017-07-06 RX ORDER — HYDRALAZINE HYDROCHLORIDE 20 MG/ML
10 INJECTION INTRAMUSCULAR; INTRAVENOUS EVERY 12 HOURS PRN
Status: DISCONTINUED | OUTPATIENT
Start: 2017-07-06 | End: 2017-07-07 | Stop reason: HOSPADM

## 2017-07-06 RX ORDER — MIDAZOLAM HYDROCHLORIDE 1 MG/ML
INJECTION, SOLUTION INTRAMUSCULAR; INTRAVENOUS
Status: DISCONTINUED | OUTPATIENT
Start: 2017-07-06 | End: 2017-07-06

## 2017-07-06 RX ORDER — BUPIVACAINE HYDROCHLORIDE 2.5 MG/ML
INJECTION, SOLUTION EPIDURAL; INFILTRATION; INTRACAUDAL
Status: DISCONTINUED | OUTPATIENT
Start: 2017-07-06 | End: 2017-07-06 | Stop reason: HOSPADM

## 2017-07-06 RX ORDER — LIDOCAINE HCL/PF 100 MG/5ML
SYRINGE (ML) INTRAVENOUS
Status: DISCONTINUED | OUTPATIENT
Start: 2017-07-06 | End: 2017-07-06

## 2017-07-06 RX ORDER — FENTANYL CITRATE 50 UG/ML
INJECTION, SOLUTION INTRAMUSCULAR; INTRAVENOUS
Status: DISCONTINUED | OUTPATIENT
Start: 2017-07-06 | End: 2017-07-06

## 2017-07-06 RX ORDER — METOCLOPRAMIDE HYDROCHLORIDE 5 MG/ML
INJECTION INTRAMUSCULAR; INTRAVENOUS
Status: DISCONTINUED | OUTPATIENT
Start: 2017-07-06 | End: 2017-07-06

## 2017-07-06 RX ORDER — SODIUM CHLORIDE 0.9 % (FLUSH) 0.9 %
3 SYRINGE (ML) INJECTION EVERY 8 HOURS
Status: DISCONTINUED | OUTPATIENT
Start: 2017-07-06 | End: 2017-07-07 | Stop reason: HOSPADM

## 2017-07-06 RX ORDER — ONDANSETRON 2 MG/ML
INJECTION INTRAMUSCULAR; INTRAVENOUS
Status: DISCONTINUED | OUTPATIENT
Start: 2017-07-06 | End: 2017-07-06

## 2017-07-06 RX ORDER — PHENYLEPHRINE HYDROCHLORIDE 10 MG/ML
INJECTION INTRAVENOUS
Status: DISCONTINUED | OUTPATIENT
Start: 2017-07-06 | End: 2017-07-06

## 2017-07-06 RX ORDER — MORPHINE SULFATE 10 MG/ML
4 INJECTION INTRAMUSCULAR; INTRAVENOUS; SUBCUTANEOUS ONCE
Status: COMPLETED | OUTPATIENT
Start: 2017-07-06 | End: 2017-07-06

## 2017-07-06 RX ORDER — ACETAMINOPHEN 10 MG/ML
1000 INJECTION, SOLUTION INTRAVENOUS ONCE
Status: COMPLETED | OUTPATIENT
Start: 2017-07-06 | End: 2017-07-06

## 2017-07-06 RX ORDER — NEOSTIGMINE METHYLSULFATE 1 MG/ML
INJECTION, SOLUTION INTRAVENOUS
Status: DISCONTINUED | OUTPATIENT
Start: 2017-07-06 | End: 2017-07-06

## 2017-07-06 RX ORDER — GLYCOPYRROLATE 0.2 MG/ML
INJECTION INTRAMUSCULAR; INTRAVENOUS
Status: DISCONTINUED | OUTPATIENT
Start: 2017-07-06 | End: 2017-07-06

## 2017-07-06 RX ORDER — SUCCINYLCHOLINE CHLORIDE 20 MG/ML
INJECTION INTRAMUSCULAR; INTRAVENOUS
Status: DISCONTINUED | OUTPATIENT
Start: 2017-07-06 | End: 2017-07-06

## 2017-07-06 RX ORDER — ROCURONIUM BROMIDE 10 MG/ML
INJECTION, SOLUTION INTRAVENOUS
Status: DISCONTINUED | OUTPATIENT
Start: 2017-07-06 | End: 2017-07-06

## 2017-07-06 RX ORDER — SODIUM CHLORIDE, SODIUM LACTATE, POTASSIUM CHLORIDE, CALCIUM CHLORIDE 600; 310; 30; 20 MG/100ML; MG/100ML; MG/100ML; MG/100ML
INJECTION, SOLUTION INTRAVENOUS CONTINUOUS PRN
Status: DISCONTINUED | OUTPATIENT
Start: 2017-07-06 | End: 2017-07-06

## 2017-07-06 RX ORDER — HYDRALAZINE HYDROCHLORIDE 20 MG/ML
10 INJECTION INTRAMUSCULAR; INTRAVENOUS ONCE
Status: COMPLETED | OUTPATIENT
Start: 2017-07-06 | End: 2017-07-06

## 2017-07-06 RX ORDER — PROPOFOL 10 MG/ML
VIAL (ML) INTRAVENOUS
Status: DISCONTINUED | OUTPATIENT
Start: 2017-07-06 | End: 2017-07-06

## 2017-07-06 RX ADMIN — ROCURONIUM BROMIDE 5 MG: 10 INJECTION, SOLUTION INTRAVENOUS at 02:07

## 2017-07-06 RX ADMIN — ISOSORBIDE MONONITRATE 60 MG: 30 TABLET, EXTENDED RELEASE ORAL at 08:07

## 2017-07-06 RX ADMIN — CEFAZOLIN SODIUM 2 G: 1 POWDER, FOR SOLUTION INTRAMUSCULAR; INTRAVENOUS at 02:07

## 2017-07-06 RX ADMIN — SODIUM CHLORIDE, SODIUM LACTATE, POTASSIUM CHLORIDE, AND CALCIUM CHLORIDE: .6; .31; .03; .02 INJECTION, SOLUTION INTRAVENOUS at 02:07

## 2017-07-06 RX ADMIN — AMLODIPINE BESYLATE 5 MG: 5 TABLET ORAL at 08:07

## 2017-07-06 RX ADMIN — NEOSTIGMINE METHYLSULFATE 2.5 MG: 1 INJECTION INTRAVENOUS at 03:07

## 2017-07-06 RX ADMIN — SODIUM CHLORIDE, SODIUM LACTATE, POTASSIUM CHLORIDE, AND CALCIUM CHLORIDE: .6; .31; .03; .02 INJECTION, SOLUTION INTRAVENOUS at 03:07

## 2017-07-06 RX ADMIN — HYDROMORPHONE HYDROCHLORIDE 0.2 MG: 2 INJECTION INTRAMUSCULAR; INTRAVENOUS; SUBCUTANEOUS at 04:07

## 2017-07-06 RX ADMIN — Medication 3 ML: at 05:07

## 2017-07-06 RX ADMIN — ROCURONIUM BROMIDE 25 MG: 10 INJECTION, SOLUTION INTRAVENOUS at 02:07

## 2017-07-06 RX ADMIN — FAMOTIDINE 20 MG: 20 TABLET, FILM COATED ORAL at 08:07

## 2017-07-06 RX ADMIN — PHENYLEPHRINE HYDROCHLORIDE 200 MCG: 10 INJECTION INTRAVENOUS at 02:07

## 2017-07-06 RX ADMIN — LIDOCAINE HYDROCHLORIDE 5 ML: 20 INJECTION, SOLUTION INTRAVENOUS at 02:07

## 2017-07-06 RX ADMIN — METOPROLOL TARTRATE 25 MG: 25 TABLET ORAL at 09:07

## 2017-07-06 RX ADMIN — ALPRAZOLAM 0.5 MG: 0.5 TABLET ORAL at 05:07

## 2017-07-06 RX ADMIN — LISINOPRIL 20 MG: 20 TABLET ORAL at 08:07

## 2017-07-06 RX ADMIN — HYDRALAZINE HYDROCHLORIDE 10 MG: 20 INJECTION INTRAMUSCULAR; INTRAVENOUS at 04:07

## 2017-07-06 RX ADMIN — Medication 20 MG: at 03:07

## 2017-07-06 RX ADMIN — PROPOFOL 50 MG: 10 INJECTION, EMULSION INTRAVENOUS at 02:07

## 2017-07-06 RX ADMIN — Medication 3 ML: at 09:07

## 2017-07-06 RX ADMIN — FENTANYL CITRATE 100 MCG: 50 INJECTION INTRAMUSCULAR; INTRAVENOUS at 02:07

## 2017-07-06 RX ADMIN — PROPOFOL 25 MG: 10 INJECTION, EMULSION INTRAVENOUS at 03:07

## 2017-07-06 RX ADMIN — MIDAZOLAM HYDROCHLORIDE 2 MG: 1 INJECTION, SOLUTION INTRAMUSCULAR; INTRAVENOUS at 02:07

## 2017-07-06 RX ADMIN — ACETAMINOPHEN 1000 MG: 10 INJECTION, SOLUTION INTRAVENOUS at 04:07

## 2017-07-06 RX ADMIN — HYDROCODONE BITARTRATE AND ACETAMINOPHEN 1 TABLET: 10; 325 TABLET ORAL at 08:07

## 2017-07-06 RX ADMIN — ONDANSETRON 4 MG: 2 INJECTION, SOLUTION INTRAMUSCULAR; INTRAVENOUS at 02:07

## 2017-07-06 RX ADMIN — HYDROCODONE BITARTRATE AND ACETAMINOPHEN 1 TABLET: 10; 325 TABLET ORAL at 12:07

## 2017-07-06 RX ADMIN — SUCCINYLCHOLINE CHLORIDE 100 MG: 20 INJECTION, SOLUTION INTRAMUSCULAR; INTRAVENOUS at 02:07

## 2017-07-06 RX ADMIN — PROPOFOL 100 MG: 10 INJECTION, EMULSION INTRAVENOUS at 03:07

## 2017-07-06 RX ADMIN — METOPROLOL TARTRATE 25 MG: 25 TABLET ORAL at 08:07

## 2017-07-06 RX ADMIN — GLYCOPYRROLATE 0.2 MG: 0.2 INJECTION, SOLUTION INTRAMUSCULAR; INTRAVENOUS at 03:07

## 2017-07-06 RX ADMIN — METOCLOPRAMIDE 10 MG: 5 INJECTION, SOLUTION INTRAMUSCULAR; INTRAVENOUS at 02:07

## 2017-07-06 RX ADMIN — ROCURONIUM BROMIDE 5 MG: 10 INJECTION, SOLUTION INTRAVENOUS at 03:07

## 2017-07-06 RX ADMIN — ATORVASTATIN CALCIUM 80 MG: 40 TABLET, FILM COATED ORAL at 08:07

## 2017-07-06 RX ADMIN — MORPHINE SULFATE 4 MG: 10 INJECTION INTRAVENOUS at 10:07

## 2017-07-06 RX ADMIN — ALPRAZOLAM 0.5 MG: 0.5 TABLET ORAL at 09:07

## 2017-07-06 RX ADMIN — NEOSTIGMINE METHYLSULFATE 2.5 MG: 1 INJECTION INTRAVENOUS at 04:07

## 2017-07-06 RX ADMIN — DOCUSATE SODIUM AND SENNOSIDES 1 TABLET: 8.6; 5 TABLET, FILM COATED ORAL at 09:07

## 2017-07-06 RX ADMIN — ASPIRIN 81 MG: 81 TABLET, COATED ORAL at 08:07

## 2017-07-06 RX ADMIN — PREDNISONE 40 MG: 20 TABLET ORAL at 05:07

## 2017-07-06 RX ADMIN — GLYCOPYRROLATE 0.2 MG: 0.2 INJECTION, SOLUTION INTRAMUSCULAR; INTRAVENOUS at 02:07

## 2017-07-06 RX ADMIN — PROPOFOL 125 MG: 10 INJECTION, EMULSION INTRAVENOUS at 02:07

## 2017-07-06 NOTE — ASSESSMENT & PLAN NOTE
Continued nausea - now with leukocytosis; normal transaminates - CP suspicious for GI source of patient's intermittent CP and nausea, abdominal US significant for gallbladder sludge  -IP consult to surgery

## 2017-07-06 NOTE — ANESTHESIA PREPROCEDURE EVALUATION
07/06/2017  Kayden Zamora is a 56 y.o., male.    Anesthesia Evaluation    I have reviewed the Patient Summary Reports.    I have reviewed the Nursing Notes.      Review of Systems  Social:  Non-Smoker    Cardiovascular:   Denies Pacemaker. Hypertension CAD   CABG/stent  Cath 7/2017 neg for obstructive CAD   Pulmonary:  Pulmonary Normal    Renal/:  Renal/ Normal     Hepatic/GI:   No Bowel Prep. Denies PUD. Denies Hiatal Hernia.  Denies GERD.  Denies Hepatitis.    Neurological:  Neurology Normal    Endocrine:  Endocrine Normal    Psych:   anxiety          Physical Exam  General:  Well nourished    Airway/Jaw/Neck:  Jaw/Neck Findings: (s/p neck surgery)                Anesthesia Plan  Type of Anesthesia, risks & benefits discussed:  Anesthesia Type:  general  Patient's Preference:   Intra-op Monitoring Plan:   Intra-op Monitoring Plan Comments:   Post Op Pain Control Plan:   Post Op Pain Control Plan Comments:   Induction:   IV  Beta Blocker:  Patient is on a Beta-Blocker and has received one dose within the past 24 hours (No further documentation required).       Informed Consent: Patient understands risks and agrees with Anesthesia plan.  Questions answered. Anesthesia consent signed with patient.  ASA Score: 2     Day of Surgery Review of History & Physical:    H&P update referred to the surgeon.     Anesthesia Plan Notes: Note: pt has had neck surgery so may required advanced airway equipment  Because the pt had heart cath today (he initially presented with symptoms that could have been cardiac and he has a cards  Hx), I called the Cardiology rounding phone to ask Dr. Guillen if he agreed with the plan for surgery today.  I did not receive a  Return phone call to my voicemail; nor did an overhead page result in a reply. However, Dr. Martinez said he had spoken   With cardiology and they ok'd the  plan.        Ready For Surgery From Anesthesia Perspective.

## 2017-07-06 NOTE — TRANSFER OF CARE
"Anesthesia Transfer of Care Note    Patient: Kayden Zamora    Procedure(s) Performed: Procedure(s) (LRB):  CHOLECYSTECTOMY-LAPAROSCOPIC (N/A)    Patient location: PACU    Anesthesia Type: general    Transport from OR: Transported from OR on room air with adequate spontaneous ventilation    Post pain: adequate analgesia    Post assessment: no apparent anesthetic complications and tolerated procedure well    Post vital signs: stable    Level of consciousness: awake, alert and oriented    Nausea/Vomiting: no nausea/vomiting    Complications: none    Transfer of care protocol was followed      Last vitals:   Visit Vitals  BP (!) 184/108   Pulse (!) 50   Temp 36.5 °C (97.7 °F) (Oral)   Resp 14   Ht 5' 9" (1.753 m)   Wt 72.7 kg (160 lb 3.2 oz)   SpO2 99%   BMI 23.66 kg/m²     "

## 2017-07-06 NOTE — NURSING
PER handoff received from RUFINA Shore RN. Responds to voice and is oriented x4. Denies having any pain and has no apparent distress. Significant other present at pt's bedside. Assessment completed per Doc Flowsheets; reference if needed. Fall and safety precautions maintained. Bed alarm activated and audible. Bed locked in lowest position, with side rails up x2. Call bell and personal items within reach. Will continue to monitor pt for any changes.

## 2017-07-06 NOTE — BRIEF OP NOTE
[facility]  Brief Operative Note    SUMMARY     Surgery Date: 7/6/2017     Surgeon(s) and Role:     * Gorge Martinez MD - Primary    Assisting Surgeon: None    Pre-op Diagnosis:  Cholecystitis [K81.9]    Post-op Diagnosis:  Post-Op Diagnosis Codes:     * Cholecystitis [K81.9]    Procedure(s) (LRB):  CHOLECYSTECTOMY-LAPAROSCOPIC (N/A)    Anesthesia: General    Description of Procedure:same  Description of the findings of the procedure: chronic cholecystitis  Estimated Blood Loss: 50cc       Specimens:   Specimen (12h ago through future)    Start     Ordered    07/06/17 1532  Specimen to Pathology - Surgery  Once     Comments:  gallbladder      07/06/17 1386

## 2017-07-06 NOTE — PLAN OF CARE
Problem: Patient Care Overview  Goal: Plan of Care Review   07/05/17 2014   Coping/Psychosocial   Plan Of Care Reviewed With patient   Pt remained free of falls during current shift. Denied pain and did not receive any prn pain medications. Pt clipped for angiogram and awaiting angiogram later in AM. Plan of care and fall precautions reviewed with pt and verbalized understanding. Bed locked, lowered, SR up x2 and call light placed within reach.

## 2017-07-06 NOTE — ASSESSMENT & PLAN NOTE
Continued NV, leukocytosis with referred chest pain, normal LHC, and evidence of gallbladder sludge correlated with abd US. Surgery consulted, evaluated the patient - cholecystectomy after LHC today

## 2017-07-06 NOTE — PROGRESS NOTES
"[facility] Medicine  Progress Note    Patient Name: Kayden Zamora  MRN: [id number]  Patient Class: OP- Observation   Admission Date: 7/3/2017  Length of Stay: 0 days  Attending Physician: Leti Noriega MD  Primary Care Provider: Oral Gallardo MD        Subjective:     Principal Problem:Acute cholecystitis with chronic cholecystitis    HPI:  Kayden Zamora is a 56 y.o. male with a history as below who presented to the ED with a CC of non-radiating left sided chest pain 7/10 with associated flutters and SOB ~ 5 days.  He describes the chest pain as "tightness" intermittent with activity with each episode lasting ~ 20-30 minutes and resolving with rest. He denies fever, chills, diaphoretic, cough, BLE edema, GI/ symptoms, recent illness, trauma or any other associated symptoms. He reports he takes a ASA 81 mg daily.  Patient states he thought symptoms could be stress/anxiety so he took an old valium that he had and without relief. He also reports he had a stent place in March 2014 and endorses non-adherences to previous medications regimen after stent placement and states "I just felt like I didn't need them". Family history - mon(a) multiple stents and TIAs; dad (a) 5 vessel CABG.  He denies tobacco or ETOH use.  Found in ED to have non-ischemic EKG.  Initial troponin negative. BNP wnl. CXR is non-revealing. Dr. Forrester, crdiology, consulted in ED with plans for LHC on Wednesday.  Admitted to OBS for symptoms management. He denies recurrent CP and is non-distressed.                                                         Hospital Course:   56 y.o. Male with a PMHx of HTN. He presented for evaluation of acute onset of exertional chest pain with associated symptoms SOB that initially began about 5 days prior to admit. Patient notes that he had a stent placed due to a 95% blockage in 2014 by Dr. Evangelista. Seen by Cardiology (Dr. Forrester), due to risk factors, plans for LHC today but " rescheduled.. Statin/ASA/ACEI/BB - Continued nausea and decreased appetite. Abdominal US obtained revealing Gallbladder sludge - suspected source of patient's problem - Will obtain a HIDA Scan now and consult to surgery if warranted - NPO after midnight anyway for LHC.    No new subjective & objective note has been filed under this hospital service since the last note was generated.    Assessment/Plan:      Chest pain    Clean LHC today,   -Start amlodipine 10 mg  -PRN hydralazine SBP>180        * Acute cholecystitis with chronic cholecystitis    Continued NV, leukocytosis with referred chest pain, normal LHC, and evidence of gallbladder sludge correlated with abd US. Surgery consulted, evaluated the patient - cholecystectomy after LHC today          Gallbladder sludge    Continued nausea - now with leukocytosis; normal transaminates - CP suspicious for GI source of patient's intermittent CP and nausea, abdominal US significant for gallbladder sludge  -IP consult to surgery          Essential hypertension    BP with spikes - elevated intermittenly, gallbladder and pain likely contributory; continue current regimen            Anxiety    Xanax home dose - continue          History of heart artery stent    See above            VTE Risk Mitigation         Ordered     Place sequential compression device  Until discontinued      07/06/17 1540     Medium Risk of VTE  Once      07/03/17 1741              MEGHAN Lima, FNP-C  PA# [id number]  NPI# [id number]  Hospitalist - Department of Hospital Medicine  [address] [place] [address]  Office [phone]; Pager [phone]

## 2017-07-06 NOTE — CONSULTS
[facility]  General Surgery  Consult Note    Consults  Subjective:     Chief Complaint/Reason for Admission: sob, chest pain    History of Present Illness: Mr Zamora is a 56 year old man who presented to the ED with shortness of breath and chest pain, and underwent angiography 7/6/17 which was subsequently found to be negative. During this admission, Mr Zamora reported RUQ pain and tenderness and nausea, as well as intermittent RUQ pain and nausea occurring over the past months, prompting workup which revealed gallbladder sludge.  Patient denies current nausea, vomiting, fever, chills, jaundice.     No current facility-administered medications on file prior to encounter.      No current outpatient prescriptions on file prior to encounter.       Review of patient's allergies indicates:   Allergen Reactions    Iodine and iodide containing products Hives       Past Medical History:   Diagnosis Date    Essential hypertension 7/3/2017    H/O heart artery stent 03/2014    H/O neck surgery      Past Surgical History:   Procedure Laterality Date    cardiac stents      KIDNEY STONE SURGERY      neck surgery       Family History     Problem Relation (Age of Onset)    Coronary artery disease Mother, Father    Heart disease Mother    Transient ischemic attack Mother        Social History Main Topics    Smoking status: Never Smoker    Smokeless tobacco: Never Used    Alcohol use No    Drug use: No    Sexual activity: Yes     Review of Systems   Denies fever, chills  Denies headache  Denies change in vision  Denies jaundice  Denies diarrhea, constipation. +abdominal pain, nasuea and vomiting per HPI  +SOB, CP per HPI  Denies abnormal bleeding or bruising  +neck pain from previous surgery  +dark urine, occasional dysuria     Objective:     Vital Signs (Most Recent):  Temp: 98.7 °F (37.1 °C) (07/06/17 0748)  Pulse: (!) 55 (07/06/17 1227)  Resp: 18 (07/06/17 0748)  BP: 131/89 (07/06/17 1227)  SpO2:  96 % (07/06/17 1227) Vital Signs (24h Range):  Temp:  [97.7 °F (36.5 °C)-99.1 °F (37.3 °C)] 98.7 °F (37.1 °C)  Pulse:  [55-71] 55  Resp:  [18] 18  SpO2:  [96 %-100 %] 96 %  BP: (103-158)/(60-99) 131/89     Weight: 72.7 kg (160 lb 3.2 oz)  Body mass index is 23.66 kg/m².      Intake/Output Summary (Last 24 hours) at 07/06/17 1312  Last data filed at 07/05/17 1400   Gross per 24 hour   Intake              240 ml   Output                0 ml   Net              240 ml       Physical Exam  Awake, alert, in no apparent distress  Anicteric sclerae  Moist mucous membranes  No increased work of breathing  Abdomen without scars or hernia, soft, mildly tender to palpation RUQ, no rebound  R groin s/p angio with pressure dressing in place  No rash or bruising  No musculoskeletal deformity  Normal affect     Significant Labs:  CBC:   Recent Labs  Lab 07/06/17  0512   WBC 15.72*   RBC 4.37*   HGB 14.3   HCT 41.3   *   MCV 95   MCH 32.7*   MCHC 34.6     CMP:   Recent Labs  Lab 07/06/17  0512   *   CALCIUM 10.2   ALBUMIN 3.9   PROT 7.7      K 4.4   CO2 29      BUN 25*   CREATININE 1.1   ALKPHOS 67   ALT <5*   AST 10   BILITOT 0.6     Coagulation: No results for input(s): INR, APTT in the last 48 hours.    Invalid input(s): PT    Significant Diagnostics:  Ultrasound: gallbladder sludge, CBD 3mm    Assessment/Plan:   56M with biliary colic 2/2 gallbladder sludge.   OR today for lap cholecystectomy  NPO    Renetta Dowell MD  General Surgery  [facility]

## 2017-07-06 NOTE — PLAN OF CARE
Problem: Patient Care Overview  Goal: Plan of Care Review  Outcome: Ongoing (interventions implemented as appropriate)   07/06/17 6636   Coping/Psychosocial   Plan Of Care Reviewed With patient   Plan of care reviewed with patient.  Pt is without falls or injuries at this time. Bed locked and in low position, call bell in reach. Fall and safety precautions maintained. Family at bedside

## 2017-07-07 VITALS
WEIGHT: 160.5 LBS | RESPIRATION RATE: 20 BRPM | BODY MASS INDEX: 23.77 KG/M2 | SYSTOLIC BLOOD PRESSURE: 101 MMHG | HEIGHT: 69 IN | DIASTOLIC BLOOD PRESSURE: 56 MMHG | OXYGEN SATURATION: 96 % | TEMPERATURE: 99 F | HEART RATE: 121 BPM

## 2017-07-07 PROBLEM — K81.2 ACUTE CHOLECYSTITIS WITH CHRONIC CHOLECYSTITIS: Status: RESOLVED | Noted: 2017-07-06 | Resolved: 2017-07-07

## 2017-07-07 PROBLEM — F41.9 ANXIETY: Status: RESOLVED | Noted: 2017-07-04 | Resolved: 2017-07-07

## 2017-07-07 PROBLEM — R07.9 CHEST PAIN: Status: RESOLVED | Noted: 2017-07-03 | Resolved: 2017-07-07

## 2017-07-07 PROBLEM — K82.8 GALLBLADDER SLUDGE: Status: RESOLVED | Noted: 2017-07-05 | Resolved: 2017-07-07

## 2017-07-07 LAB
ALBUMIN SERPL BCP-MCNC: 3.6 G/DL
ALP SERPL-CCNC: 67 U/L
ALT SERPL W/O P-5'-P-CCNC: 37 U/L
ANION GAP SERPL CALC-SCNC: 8 MMOL/L
AST SERPL-CCNC: 67 U/L
BASOPHILS # BLD AUTO: 0.01 K/UL
BASOPHILS NFR BLD: 0.1 %
BILIRUB SERPL-MCNC: 1 MG/DL
BUN SERPL-MCNC: 17 MG/DL
CALCIUM SERPL-MCNC: 9.7 MG/DL
CHLORIDE SERPL-SCNC: 98 MMOL/L
CO2 SERPL-SCNC: 30 MMOL/L
CREAT SERPL-MCNC: 1 MG/DL
DIFFERENTIAL METHOD: ABNORMAL
EOSINOPHIL # BLD AUTO: 0 K/UL
EOSINOPHIL NFR BLD: 0 %
ERYTHROCYTE [DISTWIDTH] IN BLOOD BY AUTOMATED COUNT: 13.4 %
EST. GFR  (AFRICAN AMERICAN): >60 ML/MIN/1.73 M^2
EST. GFR  (NON AFRICAN AMERICAN): >60 ML/MIN/1.73 M^2
GLUCOSE SERPL-MCNC: 144 MG/DL
HCT VFR BLD AUTO: 42.6 %
HGB BLD-MCNC: 15 G/DL
LYMPHOCYTES # BLD AUTO: 1.8 K/UL
LYMPHOCYTES NFR BLD: 10.3 %
MCH RBC QN AUTO: 33 PG
MCHC RBC AUTO-ENTMCNC: 35.2 %
MCV RBC AUTO: 94 FL
MONOCYTES # BLD AUTO: 2.2 K/UL
MONOCYTES NFR BLD: 12.3 %
NEUTROPHILS # BLD AUTO: 13.6 K/UL
NEUTROPHILS NFR BLD: 77.3 %
PLATELET # BLD AUTO: 390 K/UL
PMV BLD AUTO: 9.7 FL
POTASSIUM SERPL-SCNC: 3.8 MMOL/L
PROT SERPL-MCNC: 7.3 G/DL
RBC # BLD AUTO: 4.54 M/UL
SODIUM SERPL-SCNC: 136 MMOL/L
WBC # BLD AUTO: 17.7 K/UL

## 2017-07-07 PROCEDURE — 25000003 PHARM REV CODE 250: Performed by: EMERGENCY MEDICINE

## 2017-07-07 PROCEDURE — 94760 N-INVAS EAR/PLS OXIMETRY 1: CPT

## 2017-07-07 PROCEDURE — G0378 HOSPITAL OBSERVATION PER HR: HCPCS

## 2017-07-07 PROCEDURE — 25000003 PHARM REV CODE 250: Performed by: NURSE PRACTITIONER

## 2017-07-07 PROCEDURE — 25000003 PHARM REV CODE 250: Performed by: ANESTHESIOLOGY

## 2017-07-07 PROCEDURE — 85025 COMPLETE CBC W/AUTO DIFF WBC: CPT

## 2017-07-07 PROCEDURE — 36415 COLL VENOUS BLD VENIPUNCTURE: CPT

## 2017-07-07 PROCEDURE — 25000003 PHARM REV CODE 250: Performed by: INTERNAL MEDICINE

## 2017-07-07 PROCEDURE — 80053 COMPREHEN METABOLIC PANEL: CPT

## 2017-07-07 RX ORDER — LISINOPRIL 20 MG/1
20 TABLET ORAL DAILY
Qty: 30 TABLET | Refills: 3 | Status: SHIPPED | OUTPATIENT
Start: 2017-07-07 | End: 2019-03-25

## 2017-07-07 RX ORDER — ATORVASTATIN CALCIUM 80 MG/1
40 TABLET, FILM COATED ORAL DAILY
Qty: 30 TABLET | Refills: 3 | Status: SHIPPED | OUTPATIENT
Start: 2017-07-07 | End: 2018-07-07

## 2017-07-07 RX ADMIN — AMLODIPINE BESYLATE 5 MG: 5 TABLET ORAL at 09:07

## 2017-07-07 RX ADMIN — ATORVASTATIN CALCIUM 80 MG: 40 TABLET, FILM COATED ORAL at 09:07

## 2017-07-07 RX ADMIN — Medication 3 ML: at 05:07

## 2017-07-07 RX ADMIN — FAMOTIDINE 20 MG: 20 TABLET, FILM COATED ORAL at 09:07

## 2017-07-07 RX ADMIN — DOCUSATE SODIUM AND SENNOSIDES 1 TABLET: 8.6; 5 TABLET, FILM COATED ORAL at 09:07

## 2017-07-07 RX ADMIN — LISINOPRIL 20 MG: 20 TABLET ORAL at 09:07

## 2017-07-07 RX ADMIN — ALPRAZOLAM 0.5 MG: 0.5 TABLET ORAL at 05:07

## 2017-07-07 RX ADMIN — METOPROLOL TARTRATE 25 MG: 25 TABLET ORAL at 09:07

## 2017-07-07 RX ADMIN — HYDROCODONE BITARTRATE AND ACETAMINOPHEN 1 TABLET: 10; 325 TABLET ORAL at 09:07

## 2017-07-07 RX ADMIN — ISOSORBIDE MONONITRATE 60 MG: 30 TABLET, EXTENDED RELEASE ORAL at 09:07

## 2017-07-07 RX ADMIN — ASPIRIN 81 MG: 81 TABLET, COATED ORAL at 09:07

## 2017-07-07 NOTE — NURSING
Discharge instructions given to patient and son at bedside. Patient verbalized understanding of instructions. Patient states willingness to comply. Saline lock removed. Tele monitoring removed.

## 2017-07-07 NOTE — PROGRESS NOTES
[facility]  General Surgery  Progress Note    Subjective:     Interval History: Doing well, tolerating diet, incisional pain well controlled with PO meds, +flatus, no fever, nausea, vomiting.     Post-Op Info:  Procedure(s) (LRB):  CHOLECYSTECTOMY-LAPAROSCOPIC (N/A)   1 Day Post-Op      Medications:  Continuous Infusions:   Scheduled Meds:   alprazolam  0.5 mg Oral TID    amlodipine  5 mg Oral Daily    aspirin  81 mg Oral Daily    atorvastatin  80 mg Oral Daily    famotidine  20 mg Oral Daily    isosorbide mononitrate  60 mg Oral Daily    lisinopril  20 mg Oral Daily    metoprolol tartrate  25 mg Oral BID    senna-docusate 8.6-50 mg  1 tablet Oral BID    sodium chloride 0.9%  3 mL Intravenous Q8H    sodium chloride 0.9%  3 mL Intravenous Q8H    sodium chloride 0.9%  3 mL Intravenous Q8H     PRN Meds:acetaminophen, fentaNYL, fentaNYL, hydrALAZINE, hydrALAZINE, hydrocodone-acetaminophen 10-325mg, HYDROmorphone, HYDROmorphone, nitroGLYCERIN, ondansetron, promethazine (PHENERGAN) IVPB, ramelteon, sodium chloride 0.9%, sodium chloride 0.9%     Objective:     Vital Signs (Most Recent):  Temp: 98.6 °F (37 °C) (07/07/17 1158)  Pulse: (!) 121 (07/07/17 1158)  Resp: 20 (07/07/17 1158)  BP: (!) 101/56 (07/07/17 1158)  SpO2: 96 % (07/07/17 1158) Vital Signs (24h Range):  Temp:  [97.7 °F (36.5 °C)-99.7 °F (37.6 °C)] 98.6 °F (37 °C)  Pulse:  [] 121  Resp:  [14-23] 20  SpO2:  [94 %-99 %] 96 %  BP: (101-201)/() 101/56       Intake/Output Summary (Last 24 hours) at 07/07/17 1246  Last data filed at 07/07/17 0455   Gross per 24 hour   Intake             1360 ml   Output                0 ml   Net             1360 ml       Physical Exam  Awake, alert  Abdomen soft, incisions with dry dressings, appropriately mildly tender, non distended  Significant Labs:  CBC:   Recent Labs  Lab 07/07/17  0444   WBC 17.70*   RBC 4.54*   HGB 15.0   HCT 42.6   *   MCV 94   MCH 33.0*   MCHC 35.2      CMP:   Recent Labs  Lab 07/07/17  0444   *   CALCIUM 9.7   ALBUMIN 3.6   PROT 7.3      K 3.8   CO2 30*   CL 98   BUN 17   CREATININE 1.0   ALKPHOS 67   ALT 37   AST 67*   BILITOT 1.0       Significant Diagnostics:  None    Assessment/Plan:   56M with gallbladder sludge, biliary colic s/p lap misty POD#1  Doing well, ok to dc home  Follow up with Dr Martinez in clinic in 1 week.     Active Diagnoses:    Diagnosis Date Noted POA    History of heart artery stent [Z95.5] 07/03/2017 Not Applicable    Essential hypertension [I10] 07/03/2017 Yes      Problems Resolved During this Admission:    Diagnosis Date Noted Date Resolved POA    PRINCIPAL PROBLEM:  Acute cholecystitis with chronic cholecystitis [K81.2] 07/06/2017 07/07/2017 Yes    Gallbladder sludge [K82.8] 07/05/2017 07/07/2017 Yes    Anxiety [F41.9] 07/04/2017 07/07/2017 Yes    Chest pain [R07.9] 07/03/2017 07/07/2017 Yes         Renetta Dowell MD  General Surgery  [facility]

## 2017-07-07 NOTE — DISCHARGE INSTRUCTIONS
Complete medications as ordered  Follow all discharge instructions.  Please schedule follow up appointments as necessary      When to Call Your Doctor  Call your doctor immediately if you have any of the following:  · Severe headache  · Severe dizziness, or fainting  · Nausea or vomiting  · Fast heartbeat (higher than 100 beats per minute)  · Fever or chills  · Swollen ankles  · Weakness  · Chest Pain attacks that last longer, occur more often, or are more severe than in the past

## 2017-07-07 NOTE — PLAN OF CARE
Problem: Patient Care Overview  Goal: Plan of Care Review   07/06/17 2035   Coping/Psychosocial   Plan Of Care Reviewed With patient   Pt remained free of falls during current shift. Reported having pain and received prn pain medications. Dressing from lap misty remained clean, dry and intact. Surgery will will be monitoring incisions. Pressure dressing remains to right groin. Plan of care and fall precautions reviewed with pt and verbalized understanding. Bed locked, lowered, SR up x2 and call light placed within reach.

## 2017-07-07 NOTE — NURSING
"PER handoff received from JAMAL Rosas RN. Responds to voice and oriented x4 . Pt reported having pain to abdomen described as "soreness" to abdomen which increases with movement, however pt is no apparent distress. Bordered gauze to abdomen x3 are dry and intact and pressure dressing to right groin is also clean, dry and intact. No bleeding or hematoma noted. Assessment completed per Doc Flowsheets; reference if needed. Fall and safety precautions maintained. Bed alarm activated and audible. Bed locked in lowest position, with side rails up x2. Call bell and personal items within reach. Will continue to monitor pt for any changes.     "

## 2017-07-07 NOTE — ANESTHESIA POSTPROCEDURE EVALUATION
"Anesthesia Post Evaluation    Patient: Kayden Zamora    Procedure(s) Performed: Procedure(s) (LRB):  CHOLECYSTECTOMY-LAPAROSCOPIC (N/A)    Final Anesthesia Type: general  Patient location during evaluation: PACU  Patient participation: Yes- Able to Participate  Level of consciousness: awake and alert and oriented  Post-procedure vital signs: reviewed and stable  Pain management: adequate  Airway patency: patent  PONV status at discharge: No PONV  Anesthetic complications: no      Cardiovascular status: blood pressure returned to baseline and hemodynamically stable  Respiratory status: unassisted and room air  Hydration status: euvolemic  Follow-up not needed.        Visit Vitals  BP (!) 139/40   Pulse 106   Temp 37.4 °C (99.4 °F) (Oral)   Resp 20   Ht 5' 9" (1.753 m)   Wt 72.8 kg (160 lb 8 oz)   SpO2 99%   BMI 23.70 kg/m²       Pain/Malaika Score: Pain Assessment Performed: Yes (7/7/2017  6:08 AM)  Presence of Pain: denies (7/7/2017  6:08 AM)  Pain Rating Prior to Med Admin: 8 (7/7/2017  9:25 AM)  Pain Rating Post Med Admin: 4 (7/6/2017 10:30 PM)  Malaika Score: 10 (7/6/2017  5:05 PM)  Modified Malaika Score: 18 (7/6/2017  8:35 PM)      "

## 2017-07-07 NOTE — DISCHARGE SUMMARY
"[facility] Hospital Medicine  Discharge Summary      Patient Name: Kayden Zamora  MRN: [id number]  Admission Date: 7/3/2017  Hospital Length of Stay: 0 days  Discharge Date and Time:  07/07/2017 1:16 PM  Attending Physician: Leti Noriega MD   Discharging Provider: JANETT Cummins  Primary Care Provider: Oral Gallardo MD      HPI:   Kayden Zamora is a 56 y.o. male with a history as below who presented to the ED with a CC of non-radiating left sided chest pain 7/10 with associated flutters and SOB ~ 5 days.  He describes the chest pain as "tightness" intermittent with activity with each episode lasting ~ 20-30 minutes and resolving with rest. He denies fever, chills, diaphoretic, cough, BLE edema, GI/ symptoms, recent illness, trauma or any other associated symptoms. He reports he takes a ASA 81 mg daily.  Patient states he thought symptoms could be stress/anxiety so he took an old valium that he had and without relief. He also reports he had a stent place in March 2014 and endorses non-adherences to previous medications regimen after stent placement and states "I just felt like I didn't need them". Family history - mon(a) multiple stents and TIAs; dad (a) 5 vessel CABG.  He denies tobacco or ETOH use.  Found in ED to have non-ischemic EKG.  Initial troponin negative. BNP wnl. CXR is non-revealing. Dr. Forrester, crdiology, consulted in ED with plans for LHC on Wednesday.  Admitted to OBS for symptoms management. He denies recurrent CP and is non-distressed.     Procedure(s) (LRB):  CHOLECYSTECTOMY-LAPAROSCOPIC (N/A)      Indwelling Lines/Drains at time of discharge:   Lines/Drains/Airways          No matching active lines, drains, or airways        Hospital Course:    56 y.o. Male with a PMHx of HTN. He presented for evaluation of acute onset of exertional chest pain with associated symptoms SOB that initially began about 5 days prior to admit. Patient notes that he had a stent " placed due to a 95% blockage in 2014 by Dr. Evangelista. Seen by Cardiology (Dr. Forrester), due to risk factors, who planned for [facility], premedicated with prednisone 2/2 iodine allergy, test rescheduled due to holiday. Started Statin/ASA/ACEI/BB - Continued nausea and decreased appetite, abdominal US obtained revealing Gallbladder sludge - suspected source of patient's problem - HIDA Scan obtained and surgery consulted - LHC completed showing clean arteries no CAD. Patient taken to surgery with successful laparoscopic cholecystectomy. He has ambulated in the hallway without difficulty and tolerated meals. Bowel sounds positive post surgery, abdominal dressings dry and intact without evidence of infection or drainage. Leukocytosis stable and thought to be 2/2 to steroid use - it is stable is afebrile. Pain stable - Vitals stable - anxious for discharge. Follow up with PCP, Surgery, and Cardiology in clinic.     Consults:   Consults         Status Ordering Provider     Inpatient consult to Cardiology  Once     Provider:  Ashanti Evangelista MD    Completed BEVERLY ARMENTA     Inpatient consult to General Surgery  Once     Provider:  Gorge Martinez MD    Completed OUMAR JUNG          Significant Diagnostic Studies: Labs:   BMP:   Recent Labs  Lab 07/06/17  0512 07/07/17  0444   * 144*    136   K 4.4 3.8    98   CO2 29 30*   BUN 25* 17   CREATININE 1.1 1.0   CALCIUM 10.2 9.7   , CBC   Recent Labs  Lab 07/06/17  0512 07/07/17  0444   WBC 15.72* 17.70*   HGB 14.3 15.0   HCT 41.3 42.6   * 390*   , INR   Lab Results   Component Value Date    INR 1.3 (H) 07/03/2017   , Lipid Panel   Lab Results   Component Value Date    CHOL 258 (H) 07/04/2017    HDL 43 07/04/2017    LDLCALC 189.4 (H) 07/04/2017    TRIG 128 07/04/2017    CHOLHDL 16.7 (L) 07/04/2017   , Troponin   Recent Labs  Lab 07/04/17  0157   TROPONINI <0.006    and A1C: No results for input(s): HGBA1C in the last 4320 hours.    Pending  Diagnostic Studies:     None        Final Active Diagnoses:    Diagnosis Date Noted POA    Essential hypertension [I10] 07/03/2017 Yes    History of heart artery stent [Z95.5] 07/03/2017 Not Applicable      Problems Resolved During this Admission:    Diagnosis Date Noted Date Resolved POA    PRINCIPAL PROBLEM:  Acute cholecystitis with chronic cholecystitis [K81.2] 07/06/2017 07/07/2017 Yes    Chest pain [R07.9] 07/03/2017 07/07/2017 Yes    Gallbladder sludge [K82.8] 07/05/2017 07/07/2017 Yes    Anxiety [F41.9] 07/04/2017 07/07/2017 Yes      No new Assessment & Plan notes have been filed under this hospital service since the last note was generated.  Service: Hospital Medicine      Discharged Condition: stable    Disposition: Home or Self Care    Follow Up:  Follow-up Information     Oral Gallardo MD. Go on 7/11/2017.    Specialty:  Internal Medicine  Why:  at 9am. Follow up with Primary Care. Please bring ID, Insurance Cards, and Arrive 15mins early.  Contact information:  [address]  [place] [address]  [phone]             Ashanti Evangelista MD. Go on 7/13/2017.    Specialty:  Cardiology  Why:  at 2pm. Hospital Follow Up appointment with Cards.  Contact information:  [address] [facility] [address]  [facility] [place] [address]  [phone]             Gorge Martinez MD In 1 week.    Specialty:  General Surgery  Why:  Call the office to schedule appointment  Contact information:  [address] [facility] [address]  [place] [address]  [phone]                 Patient Instructions:     Diet Cardiac     Activity as tolerated       Medications:  Reconciled Home Medications:   Discharge Medication List as of 7/7/2017 12:48 PM      START taking these medications    Details   atorvastatin (LIPITOR) 80 MG tablet Take 0.5 tablets (40 mg total) by mouth once daily., Starting Fri 7/7/2017, Until Sat 7/7/2018, Normal      lisinopril (PRINIVIL,ZESTRIL) 20 MG tablet Take 1 tablet (20 mg total) by mouth once  daily., Starting Fri 7/7/2017, Until Sat 7/7/2018, Normal         CONTINUE these medications which have NOT CHANGED    Details   alprazolam (XANAX) 0.5 MG tablet Take 0.5 mg by mouth 3 (three) times daily., Historical Med      aspirin (ECOTRIN) 81 MG EC tablet Take 81 mg by mouth once daily., Historical Med      diazePAM (VALIUM) 5 MG tablet Take 5 mg by mouth continuous prn for Anxiety., Historical Med      hydrocodone-acetaminophen 10-325mg (NORCO)  mg Tab Take by mouth as needed for Pain (states 5x daily)., Historical Med      METOPROLOL SUCCINATE ORAL Take by mouth., Historical Med           Time spent on the discharge of patient: 40 minutes        MEGHAN Lima, FNP-C  PA# [id number]  NPI# [id number]  Hospitalist - Department of Hospital Medicine  [address] [place] [address]  Office [phone]; Pager [phone]

## 2017-07-07 NOTE — NURSING
After received prn dose of Norco pt reported having a slight improvement of pain management. Pt is continuing to lie in the fetal position. JUANJO Salazar notified and a order was placed for 1x dose of morphine 4mg. Will reassess pt's pain rating and continue to monitor pt closely.

## 2017-07-07 NOTE — PLAN OF CARE
07/07/17 1242   Final Note   Assessment Type Final Discharge Note   Discharge Disposition Home   Discharge planning education complete? Yes   Hospital Follow Up  Appt(s) scheduled? Yes   Discharge plans and expectations educations in teach back method with documentation complete? Yes   Offered [facility] Bedside Delivery? n/a   Discharge/Hospital Encounter Summary to ([facility]) PCP n/a   Referral to Outpatient Case Management complete? n/a   Referral to / orders for Home Health Complete? n/a   30 day supply of medicines given at discharge, if documented non-compliance / non-adherence? n/a   Any social issues identified prior to discharge? n/a   Did you assess the readiness or willingness of the family or caregiver to support self management of care? n/a   Right Care Referral Info   Post Acute Recommendation No Care     Follow-up Information     Oral Gallardo MD. Go on 7/11/2017.    Specialty:  Internal Medicine  Why:  at 9am. Follow up with Primary Care. Please bring ID, Insurance Cards, and Arrive 15mins early.  Contact information:  [address]  [facility] [address]  [phone]             Ashanti Evangelista MD. Go on 7/13/2017.    Specialty:  Cardiology  Why:  at 2pm. Hospital Follow Up appointment with Cards.  Contact information:  [address] [facility] [address]  [facility] [id number] [phone]             Goreg Martinez MD On 7/18/2017.    Specialty:  General Surgery  Why:  Appointment scheduled for Tuesday July 18th at 3pm. Please be sure to keep all scheduled appointments   Contact information:  [address] [facility]  [address]  [facility] [address]  [phone]                 Notified pts nurse Maria Teresa SRIVASTAVA that all CM needs have been addressed and that she may proceed with nursing d/c instructions and teaching to complete d/c process.

## 2017-07-14 NOTE — OP NOTE
DATE OF PROCEDURE:  07/13/2017    PREOPERATIVE DIAGNOSES:  Chronic cholecystitis and cholelithiasis.    POSTOPERATIVE DIAGNOSES:  Chronic cholecystitis and cholelithiasis.    OPERATIVE PROCEDURE:  Laparoscopic cholecystectomy.    SURGEON:  Gorge Martinez M.D.    ASSISTANT:  Renetta Dowell M.D., (RES).    PROCEDURE IN DETAIL:  After adequate premedication, the patient was taken to the   Operating Room and placed on operating table in supine position.  General   anesthesia administered via endotracheal tube.  The patient's abdomen was   prepped and draped in sterile fashion.  Infraumbilical incision was made.  The   abdomen was grasped and elevated.  Veress needle was inserted and the abdomen   insufflated with CO2.  A 5 mm trocar introduced.  A laparoscope was placed.  The   other 3 trocars placed in usual position under direct vision.  Gallbladder was   grasped and elevated.  The neck of the gallbladder completely skeletonized,   identifying cystic duct and cystic artery.  Calot triangle dissected.  No   anatomic abnormalities were noted.  Cystic duct was triply clipped and divided.    Cystic artery triply clipped and divided.  Gallbladder removed from the   gallbladder fossa utilizing cautery.  Gallbladder was then removed through the   epigastric trocar site.  The operative site was irrigated.  Hemostasis was   checked.  The abdomen was desufflated.  The trocars removed.  The wounds closed   with 3-0 nylon.  Blood loss was negligible.  The patient tolerated the procedure   and was transported to Recovery in stable condition.      MELINA/  dd: 07/13/2017 14:25:17 (CDT)  td: 07/13/2017 22:40:21 (CDT)  Doc ID   #[id number]  Job ID #[id number]    CC:

## 2017-07-24 NOTE — UM SECONDARY REVIEW
Physician Advisor Internal    Observation > 48 hours   7/6 UPDATE HAD A CLEAN HEART CATH, TAKEN TO OR RIGHT AFTER AND IS NOW S/P JESÚS ROQUE      Approved Observation   OBS approved

## 2019-03-25 ENCOUNTER — HOSPITAL ENCOUNTER (EMERGENCY)
Facility: HOSPITAL | Age: 59
Discharge: HOME | End: 2019-03-25
Attending: EMERGENCY MEDICINE

## 2019-03-25 VITALS
TEMPERATURE: 98 F | SYSTOLIC BLOOD PRESSURE: 140 MMHG | RESPIRATION RATE: 18 BRPM | WEIGHT: 170 LBS | OXYGEN SATURATION: 98 % | DIASTOLIC BLOOD PRESSURE: 97 MMHG | BODY MASS INDEX: 23.8 KG/M2 | HEIGHT: 71 IN | HEART RATE: 96 BPM

## 2019-03-25 DIAGNOSIS — M54.42 ACUTE LEFT-SIDED LOW BACK PAIN WITH LEFT-SIDED SCIATICA: Primary | ICD-10-CM

## 2019-03-25 PROCEDURE — 96372 THER/PROPH/DIAG INJ SC/IM: CPT | Mod: 59

## 2019-03-25 PROCEDURE — 63600175 PHARM REV CODE 636 W HCPCS: Performed by: NURSE PRACTITIONER

## 2019-03-25 PROCEDURE — 99284 EMERGENCY DEPT VISIT MOD MDM: CPT

## 2019-03-25 RX ORDER — OXYCODONE AND ACETAMINOPHEN 7.5; 325 MG/1; MG/1
1 TABLET ORAL EVERY 4 HOURS PRN
COMMUNITY

## 2019-03-25 RX ORDER — PREDNISONE 20 MG/1
60 TABLET ORAL DAILY
Qty: 15 TABLET | Refills: 0 | Status: SHIPPED | OUTPATIENT
Start: 2019-03-25 | End: 2019-03-30

## 2019-03-25 RX ORDER — KETOROLAC TROMETHAMINE 30 MG/ML
30 INJECTION, SOLUTION INTRAMUSCULAR; INTRAVENOUS
Status: COMPLETED | OUTPATIENT
Start: 2019-03-25 | End: 2019-03-25

## 2019-03-25 RX ORDER — PREDNISONE 20 MG/1
60 TABLET ORAL
Status: COMPLETED | OUTPATIENT
Start: 2019-03-25 | End: 2019-03-25

## 2019-03-25 RX ADMIN — PREDNISONE 60 MG: 20 TABLET ORAL at 07:03

## 2019-03-25 RX ADMIN — KETOROLAC TROMETHAMINE 30 MG: 30 INJECTION, SOLUTION INTRAMUSCULAR; INTRAVENOUS at 07:03

## 2019-03-25 NOTE — ED PROVIDER NOTES
Encounter Date: 3/25/2019  This is a SORT/MSE of a 58 y.o. male presenting to the ED with c/o left low back pain that radiates down left leg. Care will be transferred to an alternate provider when patient is roomed for a full evaluation and final disposition. Patient is aware that he/she is awaiting a room in the emergency department, where another provider will review results, evaluate and treat as needed. TIEN Polanco DNP  SCRIBE #1 NOTE: IMitchell, abdirizak scribing for, and in the presence of,  Astrid Hope NP. I have scribed the following portions of the note - Other sections scribed: HPI, ROS.       History     Chief Complaint   Patient presents with    Back Pain     left lower back pain radiating to left knee; denies any recent trauma    Leg Pain     CC: Back Pain    HPI: This 58 y.o. Male with essential HTN presents to the ED for an evaluation of lower back pain which radiates down his L leg and causes L knee pain which began today. Pt reports he was bike racing yesterday and felt normal afterwards until this morning. He has no hx of sciatica. Pt attempted percocet with no relief. He takes percocet for neck pain since his surgery and complies with his at home meds. Pt denies fever, abdominal pain, diarrhea, nausea, chest pain, SOB or rhinorrhea.    The history is provided by the patient. No  was used.     Review of patient's allergies indicates:   Allergen Reactions    Iodine and iodide containing products Hives     Past Medical History:   Diagnosis Date    Essential hypertension 7/3/2017    H/O heart artery stent 03/2014    H/O neck surgery      Past Surgical History:   Procedure Laterality Date    cardiac stents      CHOLECYSTECTOMY-LAPAROSCOPIC N/A 7/6/2017    [facility]formed [facility] OR    HEART CATH-LEFT Left 7/6/2017    Performed by Blanco Estes [facility] at [facility] CATH LAB    KIDNEY STONE SURGERY      neck surgery       Family History   Problem  Relation Age of Onset    Transient ischemic attack Mother     Heart disease Mother     Coronary artery disease Mother         multiple stents    Coronary artery disease Father         5 vessel CABG     Social History     Tobacco Use    Smoking status: Never Smoker    Smokeless tobacco: Never Used   Substance Use Topics    Alcohol use: No    Drug use: No     Review of Systems   Constitutional: Negative for chills and fever.   HENT: Negative for ear pain, rhinorrhea and sore throat.    Eyes: Negative for redness.   Respiratory: Negative for shortness of breath.    Cardiovascular: Negative for chest pain.   Gastrointestinal: Negative for abdominal pain, diarrhea, nausea and vomiting.   Genitourinary: Negative for dysuria and hematuria.   Musculoskeletal: Positive for arthralgias and back pain. Negative for neck pain.   Skin: Negative for rash.   Neurological: Negative for weakness, numbness and headaches.   Hematological: Does not bruise/bleed easily.       Physical Exam     Initial Vitals [03/25/19 1732]   BP Pulse Resp Temp SpO2   (!) 123/96 102 20 97.8 °F (36.6 °C) 99 %      MAP       --         Physical Exam    Nursing note and vitals reviewed.  Constitutional: He appears well-developed and well-nourished.   HENT:   Head: Normocephalic.   Eyes: Conjunctivae are normal.   Neck: Normal range of motion. Neck supple.   Musculoskeletal: Normal range of motion. He exhibits no edema.   Mild tenderness over the left SI joint   Neurological: He is alert and oriented to person, place, and time. He has normal strength and normal reflexes. He displays normal reflexes. No cranial nerve deficit or sensory deficit.   Skin: Skin is warm and dry. Capillary refill takes less than 2 seconds.   Psychiatric: He has a normal mood and affect.         ED Course   Procedures  Labs Reviewed - No data to display       Imaging Results    None          Medical Decision Making:   Differential Diagnosis:   Back strain, muscle spasm,  sciatica, radiculopathy  ED Management:  Diagnosis management comments: This is an urgent evaluation of a 58-year-old male  that presented to the ER with c/o left-sided lower back pain that radiates down his leg. Pts exam was as above.     Patient has no focal neuro deficits.  He denies any loss of bowel or bladder control, saddle paresthesias, weakness or numbness to extremities. Patient is directly tender over the SI joint and based on the history of increased activity yesterday, he may have a sacroiliitis. At this time, I will treat for lower back pain with sciatica.  I will discharge patient with a burst of prednisone.  He and encouraged him to take his Percocet and Soma which he already has at home.  I have also encouraged use of ice to the area and follow up with his PCP for continued management.    Based on exam today - I have low suspicion for medical, surgical or other life threatening condition and I believe pt is safe for discharge and outpatient f/u.    Pt verbalizes understanding of d/c instructions and will return for worsening condition.                Scribe Attestation:   Scribe #1: I performed the above scribed service and the documentation accurately describes the services I performed. I attest to the accuracy of the note.    Attending Attestation:           Physician Attestation for Scribe:  Physician Attestation Statement for Scribe #1: I, Astrid Hope NP, reviewed documentation, as scribed by Mitchell Bower in my presence, and it is both accurate and complete.                    Clinical Impression:       ICD-10-CM ICD-9-CM   1. Acute left-sided low back pain with left-sided sciatica M54.42 724.2     724.3         Disposition:   Disposition: Discharged  Condition: Stable                        Astrid Hope NP  03/25/19 1915

## 2021-05-04 ENCOUNTER — PATIENT MESSAGE (OUTPATIENT)
Dept: PHARMACY | Facility: CLINIC | Age: 61
End: 2021-05-04

## (undated) DEVICE — SCISSOR CURVED ENDOPATH 5MM

## (undated) DEVICE — HEMOSTAT SURGICEL 4X8IN

## (undated) DEVICE — CLOSURE SKIN STERI STRIP 1/2X4

## (undated) DEVICE — COVER OVERHEAD SURG LT BLUE

## (undated) DEVICE — PACK ENDOSCOPY GENERAL

## (undated) DEVICE — CANISTER SUCTION 2 LTR

## (undated) DEVICE — SEE MEDLINE ITEM 152622

## (undated) DEVICE — IRRIGATOR ENDOSCOPY DISP.

## (undated) DEVICE — TROCAR ENDOPATH XCEL 11MM 10CM

## (undated) DEVICE — APPLIER CLIP ENDO MED/LG 10MM

## (undated) DEVICE — GLOVE BIOGEL ECLIPSE SZ 7.5

## (undated) DEVICE — NDL INSUF ULTRA VERESS 120MM

## (undated) DEVICE — SEE MEDLINE ITEM 146292

## (undated) DEVICE — TUBING INSUFFLATION 10

## (undated) DEVICE — SUT ETHILON 3/0 18IN PS-1

## (undated) DEVICE — BLADE SURG CARBON STEEL SZ11

## (undated) DEVICE — NDL HYPO REG 25G X 1 1/2

## (undated) DEVICE — SOL NS 1000CC

## (undated) DEVICE — KIT ANTIFOG

## (undated) DEVICE — ELECTRODE REM PLYHSV RETURN 9

## (undated) DEVICE — Device

## (undated) DEVICE — CHLORAPREP W TINT 26ML APPL

## (undated) DEVICE — TROCAR ENDOPATH XCEL 5X100MM

## (undated) DEVICE — DRESSING ADH ISLAND 2.5 X 3

## (undated) DEVICE — SYR 10CC LUER LOCK

## (undated) DEVICE — FOAM APP FILM BARRIER NO STING